# Patient Record
Sex: FEMALE | Race: WHITE | NOT HISPANIC OR LATINO | Employment: PART TIME | URBAN - METROPOLITAN AREA
[De-identification: names, ages, dates, MRNs, and addresses within clinical notes are randomized per-mention and may not be internally consistent; named-entity substitution may affect disease eponyms.]

---

## 2019-05-07 ENCOUNTER — OFFICE VISIT (OUTPATIENT)
Dept: URGENT CARE | Facility: PHYSICIAN GROUP | Age: 24
End: 2019-05-07
Payer: COMMERCIAL

## 2019-05-07 ENCOUNTER — HOSPITAL ENCOUNTER (OUTPATIENT)
Facility: MEDICAL CENTER | Age: 24
End: 2019-05-07
Attending: FAMILY MEDICINE
Payer: COMMERCIAL

## 2019-05-07 VITALS
SYSTOLIC BLOOD PRESSURE: 104 MMHG | HEIGHT: 63 IN | WEIGHT: 160 LBS | TEMPERATURE: 98.6 F | OXYGEN SATURATION: 98 % | HEART RATE: 89 BPM | DIASTOLIC BLOOD PRESSURE: 72 MMHG | BODY MASS INDEX: 28.35 KG/M2

## 2019-05-07 DIAGNOSIS — N30.01 ACUTE CYSTITIS WITH HEMATURIA: ICD-10-CM

## 2019-05-07 LAB
APPEARANCE UR: CLEAR
BILIRUB UR STRIP-MCNC: NORMAL MG/DL
COLOR UR AUTO: YELLOW
GLUCOSE UR STRIP.AUTO-MCNC: NORMAL MG/DL
KETONES UR STRIP.AUTO-MCNC: NORMAL MG/DL
LEUKOCYTE ESTERASE UR QL STRIP.AUTO: NORMAL
NITRITE UR QL STRIP.AUTO: NORMAL
PH UR STRIP.AUTO: 7 [PH] (ref 5–8)
PROT UR QL STRIP: NORMAL MG/DL
RBC UR QL AUTO: NORMAL
SP GR UR STRIP.AUTO: 1.01
UROBILINOGEN UR STRIP-MCNC: 0.2 MG/DL

## 2019-05-07 PROCEDURE — 81002 URINALYSIS NONAUTO W/O SCOPE: CPT | Performed by: FAMILY MEDICINE

## 2019-05-07 PROCEDURE — 87086 URINE CULTURE/COLONY COUNT: CPT

## 2019-05-07 PROCEDURE — 99203 OFFICE O/P NEW LOW 30 MIN: CPT | Performed by: FAMILY MEDICINE

## 2019-05-07 PROCEDURE — 87077 CULTURE AEROBIC IDENTIFY: CPT

## 2019-05-07 PROCEDURE — 87186 SC STD MICRODIL/AGAR DIL: CPT

## 2019-05-07 RX ORDER — NITROFURANTOIN 25; 75 MG/1; MG/1
100 CAPSULE ORAL EVERY 12 HOURS
Qty: 10 CAP | Refills: 0 | Status: SHIPPED | OUTPATIENT
Start: 2019-05-07 | End: 2019-05-12

## 2019-05-07 RX ORDER — PHENAZOPYRIDINE HYDROCHLORIDE 200 MG/1
200 TABLET, FILM COATED ORAL 3 TIMES DAILY
Qty: 6 TAB | Refills: 0 | Status: SHIPPED | OUTPATIENT
Start: 2019-05-07 | End: 2019-05-09

## 2019-05-07 RX ORDER — NORETHINDRONE ACETATE AND ETHINYL ESTRADIOL 1.5-30(21)
KIT ORAL
COMMUNITY

## 2019-05-07 ASSESSMENT — ENCOUNTER SYMPTOMS
EYE REDNESS: 0
FLANK PAIN: 0
EYE DISCHARGE: 0
WEIGHT LOSS: 0

## 2019-05-07 NOTE — PROGRESS NOTES
"Subjective:      Sushma Verde is a 23 y.o. female who presents with Dysuria (blood in urine x2days)            2 days urinary burning, urgency, frequency, hematuria. No fever. No flank pain. No PMH UTI or pyelonephritis. No OTC medication. Trying to push fluids. Sx's severe yesterday. No other aggravating or alleviating factors.      Denies possible pregnancy. LMP 5/2. No concern for STI.     Review of Systems   Constitutional: Negative for malaise/fatigue and weight loss.   Eyes: Negative for discharge and redness.   Genitourinary: Negative for dysuria, flank pain, frequency, hematuria and urgency.        No vaginal discharge     Skin: Negative for itching and rash.     .  Medications, Allergies, and current problem list reviewed today in Epic       Objective:     /72   Pulse 89   Temp 37 °C (98.6 °F) (Temporal)   Ht 1.6 m (5' 3\")   Wt 72.6 kg (160 lb)   LMP 05/02/2019   SpO2 98%   BMI 28.34 kg/m²      Physical Exam   Constitutional: She is oriented to person, place, and time. She appears well-developed and well-nourished. No distress.   HENT:   Head: Normocephalic and atraumatic.   Eyes: Conjunctivae are normal.   Cardiovascular: Normal rate, regular rhythm and normal heart sounds.    Pulmonary/Chest: Effort normal and breath sounds normal.   Genitourinary:   Genitourinary Comments: Suprapubic tenderness. No CVAT   Neurological: She is alert and oriented to person, place, and time.   Skin: Skin is warm and dry. No rash noted.               Assessment/Plan:     UA reviewed    1. Acute cystitis with hematuria  POCT Urinalysis    nitrofurantoin monohyd macro (MACROBID) 100 MG Cap    phenazopyridine (PYRIDIUM) 200 MG Tab    Urine Culture     Differential diagnosis, natural history, supportive care, and indications for immediate follow-up discussed at length.     Will f/u culture    "

## 2019-05-08 LAB
AMBIGUOUS DTTM AMBI4: NORMAL
SIGNIFICANT IND 70042: NORMAL
SITE SITE: NORMAL
SOURCE SOURCE: NORMAL

## 2019-05-10 LAB
BACTERIA UR CULT: ABNORMAL
BACTERIA UR CULT: ABNORMAL
SIGNIFICANT IND 70042: ABNORMAL
SITE SITE: ABNORMAL
SOURCE SOURCE: ABNORMAL

## 2019-07-26 ENCOUNTER — OFFICE VISIT (OUTPATIENT)
Dept: URGENT CARE | Facility: PHYSICIAN GROUP | Age: 24
End: 2019-07-26
Payer: COMMERCIAL

## 2019-07-26 VITALS
TEMPERATURE: 97.9 F | SYSTOLIC BLOOD PRESSURE: 118 MMHG | HEIGHT: 63 IN | HEART RATE: 69 BPM | BODY MASS INDEX: 30.12 KG/M2 | DIASTOLIC BLOOD PRESSURE: 82 MMHG | OXYGEN SATURATION: 95 % | WEIGHT: 170 LBS

## 2019-07-26 DIAGNOSIS — H10.31 ACUTE CONJUNCTIVITIS OF RIGHT EYE, UNSPECIFIED ACUTE CONJUNCTIVITIS TYPE: ICD-10-CM

## 2019-07-26 PROCEDURE — 99214 OFFICE O/P EST MOD 30 MIN: CPT | Performed by: PHYSICIAN ASSISTANT

## 2019-07-26 RX ORDER — POLYMYXIN B SULFATE AND TRIMETHOPRIM 1; 10000 MG/ML; [USP'U]/ML
1 SOLUTION OPHTHALMIC EVERY 4 HOURS
Qty: 10 ML | Refills: 0 | Status: SHIPPED | OUTPATIENT
Start: 2019-07-26 | End: 2019-07-31

## 2019-07-26 ASSESSMENT — ENCOUNTER SYMPTOMS
VOMITING: 0
FOREIGN BODY SENSATION: 0
FEVER: 0
MYALGIAS: 0
SORE THROAT: 0
EYE REDNESS: 1
EYE ITCHING: 1
BLURRED VISION: 0
DOUBLE VISION: 0
SHORTNESS OF BREATH: 0
EYE DISCHARGE: 1
NAUSEA: 0
HEADACHES: 0
COUGH: 0
ABDOMINAL PAIN: 0

## 2019-07-26 NOTE — PROGRESS NOTES
Subjective:      Sushma Verde is a 23 y.o. female who presents with Eye Problem (R eye, itchy, red, discharge in the morning causing it to be closed x1 day )          Eye Problem    The right eye is affected. The current episode started yesterday. The problem occurs constantly. The problem has been gradually improving. There was no injury mechanism. The patient is experiencing no pain. There is no known exposure to pink eye. She does not wear contacts. Associated symptoms include an eye discharge, eye redness, itching and a recent URI. Pertinent negatives include no blurred vision, double vision, fever, foreign body sensation, nausea or vomiting. Treatments tried: OTC allergy medication and OTC cold medications. The treatment provided mild relief.     The patient presents to clinic c/o right eye redness and discharge x 1 day. The patient reports associated congestion. The patient is concerned she may have pink eye. The patient also reports seasonal allergies. The patient notes crusting to her right eye yesterday morning. She also states the right eye is itchy. The patient denies eye pain and vision changes. The patient has taken OTC allergy medication and cold medication for her symptoms. No fever. No headache.       PMH:  has a past medical history of Seizure ([facility]) (8-2010).  MEDS:   Current Outpatient Prescriptions:   •  norethindrone-ethinyl estradiol-iron (MICROGESTIN FE1.5/30) 1.5-30 MG-MCG tablet, Take  by mouth., Disp: , Rfl:   •  zonisamide (ZONEGRAN) 100 MG CAPS, Take 100 mg by mouth every bedtime., Disp: , Rfl:   ALLERGIES: No Known Allergies  SURGHX:   Past Surgical History:   Procedure Laterality Date   • ACL RECONSTRUCTION SCOPE  2/29/2012    Performed by KRISSY BONE at SURGERY [facility] ORS   • MENISCECTOMY  2/29/2012    Performed by KRISSY BONE at SURGERY [facility] ORS   • DENTAL EXTRACTION(S)  7-2011    wisdom teeth     SOCHX:  reports that she has never smoked. She has never  "used smokeless tobacco. She reports that she drinks alcohol. She reports that she does not use drugs.  FH: Family history was reviewed, no pertinent findings to report    Review of Systems   Constitutional: Negative for fever.   HENT: Positive for congestion. Negative for ear pain and sore throat.    Eyes: Positive for discharge, redness and itching. Negative for blurred vision and double vision.   Respiratory: Negative for cough and shortness of breath.    Cardiovascular: Negative for chest pain and leg swelling.   Gastrointestinal: Negative for abdominal pain, nausea and vomiting.   Musculoskeletal: Negative for myalgias.   Skin: Negative for rash.   Neurological: Negative for headaches.          Objective:     /82 (BP Location: Right arm, Patient Position: Sitting, BP Cuff Size: Adult)   Pulse 69   Temp 36.6 °C (97.9 °F) (Temporal)   Ht 1.6 m (5' 3\")   Wt 77.1 kg (170 lb)   SpO2 95%   BMI 30.11 kg/m²      Physical Exam   Constitutional: She is oriented to person, place, and time. She appears well-developed and well-nourished. No distress.   HENT:   Head: Normocephalic and atraumatic.   Right Ear: External ear normal.   Left Ear: External ear normal.   Nose: Nose normal.   Mouth/Throat: Oropharynx is clear and moist.   Eyes: Pupils are equal, round, and reactive to light. EOM are normal. Right eye exhibits discharge. Left eye exhibits no discharge. Right conjunctiva is injected. Left conjunctiva is not injected.   Neck: Normal range of motion. Neck supple.   Cardiovascular: Normal rate.    Pulmonary/Chest: Effort normal.   Musculoskeletal: Normal range of motion.   Moves all 4 extremities.    Neurological: She is alert and oriented to person, place, and time.   Skin: Skin is warm and dry.               Assessment/Plan:     1. Acute conjunctivitis of right eye, unspecified acute conjunctivitis type  The patient's presenting symptoms and physical exam are consistent with acute conjunctivitis. Will " prescribe the patient a topical opthalmic antibiotic for her symptoms. Will also prescribe the patient Naphcon-A for symptomatic relief of her eye itching. Discussed strict return precautions with the patient, and she verbalized understanding.    - polymixin-trimethoprim (POLYTRIM) 56177-1.1 UNIT/ML-% Solution; Place 1 Drop in right eye every 4 hours for 5 days.  Dispense: 10 mL; Refill: 0  - naphazoline-pheniramine (NAPHCON-A) 0.025-0.3 % ophthalmic solution; Place 1 Drop in both eyes 4 times a day as needed.  Dispense: 1 Bottle; Refill: 0'    OTC Tylenol or Motrin for fever/discomfort.  Follow-up with PCP as needed  Return to clinic or go to the ED if symptoms worsen or fail to improve, or if the patient should develop worsening/increasing eye redness, persistent eye discharge, eye pain, vision changes, headache, fever/chills, and/or any concerning symptoms.     Discussed plan with the patient, and she agrees to the above.

## 2019-10-23 ENCOUNTER — OFFICE VISIT (OUTPATIENT)
Dept: URGENT CARE | Facility: CLINIC | Age: 24
End: 2019-10-23
Payer: COMMERCIAL

## 2019-10-23 VITALS
TEMPERATURE: 99.9 F | OXYGEN SATURATION: 95 % | SYSTOLIC BLOOD PRESSURE: 106 MMHG | HEIGHT: 64 IN | RESPIRATION RATE: 14 BRPM | BODY MASS INDEX: 28.85 KG/M2 | HEART RATE: 98 BPM | DIASTOLIC BLOOD PRESSURE: 72 MMHG | WEIGHT: 169 LBS

## 2019-10-23 DIAGNOSIS — J02.9 VIRAL PHARYNGITIS: ICD-10-CM

## 2019-10-23 LAB
HETEROPH AB SER QL LA: NEGATIVE
INT CON NEG: NORMAL
INT CON NEG: NORMAL
INT CON POS: NORMAL
INT CON POS: NORMAL
S PYO AG THROAT QL: NEGATIVE

## 2019-10-23 PROCEDURE — 87880 STREP A ASSAY W/OPTIC: CPT | Performed by: PHYSICIAN ASSISTANT

## 2019-10-23 PROCEDURE — 99214 OFFICE O/P EST MOD 30 MIN: CPT | Performed by: PHYSICIAN ASSISTANT

## 2019-10-23 PROCEDURE — 86308 HETEROPHILE ANTIBODY SCREEN: CPT | Performed by: PHYSICIAN ASSISTANT

## 2019-10-23 ASSESSMENT — ENCOUNTER SYMPTOMS
SORE THROAT: 1
SWOLLEN GLANDS: 1
TROUBLE SWALLOWING: 1
MUSCULOSKELETAL NEGATIVE: 1
DIZZINESS: 0
DIARRHEA: 0
SPUTUM PRODUCTION: 0
CHILLS: 0
VOMITING: 0
ABDOMINAL PAIN: 0
FEVER: 0
COUGH: 0
NAUSEA: 0
SHORTNESS OF BREATH: 0

## 2019-10-24 NOTE — PROGRESS NOTES
"Subjective:      Sushma Verde is a 23 y.o. female who presents with Pharyngitis (couple of days)            Pharyngitis    This is a new problem. The current episode started in the past 7 days (2 days). The problem has been unchanged. There has been no fever. The pain is at a severity of 2/10. The pain is mild. Associated symptoms include swollen glands and trouble swallowing. Pertinent negatives include no abdominal pain, congestion, coughing, diarrhea, ear pain, plugged ear sensation, shortness of breath or vomiting. She has had no exposure to strep or mono. She has tried nothing for the symptoms.     No history of tonsillectomy.     Review of Systems   Constitutional: Negative for chills and fever.   HENT: Positive for sore throat and trouble swallowing. Negative for congestion and ear pain.    Respiratory: Negative for cough, sputum production and shortness of breath.    Cardiovascular: Negative for chest pain.   Gastrointestinal: Negative for abdominal pain, diarrhea, nausea and vomiting.   Genitourinary: Negative.    Musculoskeletal: Negative.    Skin: Negative for rash.   Neurological: Negative for dizziness.        Objective:     /72 (BP Location: Left arm, Patient Position: Sitting, BP Cuff Size: Adult)   Pulse 98   Temp 37.7 °C (99.9 °F)   Resp 14   Ht 1.626 m (5' 4\")   Wt 76.7 kg (169 lb)   SpO2 95%   BMI 29.01 kg/m²      Physical Exam   Constitutional: She is oriented to person, place, and time. She appears well-developed and well-nourished. No distress.   HENT:   Head: Normocephalic and atraumatic.   Right Ear: Hearing, tympanic membrane, external ear and ear canal normal.   Left Ear: Hearing, tympanic membrane, external ear and ear canal normal.   Mouth/Throat: Uvula is midline. No trismus in the jaw. Posterior oropharyngeal erythema present. No oropharyngeal exudate, posterior oropharyngeal edema or tonsillar abscesses. Tonsils are 3+ on the right. Tonsils are 3+ on the left. No " tonsillar exudate.   Eyes: Pupils are equal, round, and reactive to light. Conjunctivae are normal. Right eye exhibits no discharge. Left eye exhibits no discharge.   Neck: Normal range of motion.   Cardiovascular: Normal rate, regular rhythm and normal heart sounds.   No murmur heard.  Pulmonary/Chest: Effort normal. No stridor. No respiratory distress. She has no wheezes.   Musculoskeletal: Normal range of motion.   Lymphadenopathy:     She has cervical adenopathy.   Neurological: She is alert and oriented to person, place, and time.   Skin: Skin is warm and dry. She is not diaphoretic.   Psychiatric: She has a normal mood and affect. Her behavior is normal.   Nursing note and vitals reviewed.         PMH:  has a past medical history of Seizure ([facility]) (8-2010).  MEDS:   Current Outpatient Medications:   •  naphazoline-pheniramine (NAPHCON-A) 0.025-0.3 % ophthalmic solution, Place 1 Drop in both eyes 4 times a day as needed., Disp: 1 Bottle, Rfl: 0  •  norethindrone-ethinyl estradiol-iron (MICROGESTIN FE1.5/30) 1.5-30 MG-MCG tablet, Take  by mouth., Disp: , Rfl:   •  zonisamide (ZONEGRAN) 100 MG CAPS, Take 100 mg by mouth every bedtime., Disp: , Rfl:   ALLERGIES: No Known Allergies  SURGHX:   Past Surgical History:   Procedure Laterality Date   • ACL RECONSTRUCTION SCOPE  2/29/2012    Performed by KRISSY BONE at SURGERY [facility]   • MENISCECTOMY  2/29/2012    Performed by KRISSY BONE at SURGERY [facility] ORS   • DENTAL EXTRACTION(S)  7-2011    wisdom teeth     SOCHX:  reports that she has never smoked. She has never used smokeless tobacco. She reports that she drinks alcohol. She reports that she does not use drugs.  FH: family history is not on file.       Assessment/Plan:     1. Viral pharyngitis  - POCT Rapid Strep A: NEGATIVE   - POCT Mononucleosis (mono): NEGATIVE     Advised patient symptoms are most likely viral in etiology, recommend supportive care. Increased fluids and rest. Encouraged  warm salt water gargles and OTC ibuprofen as needed for symptomatic relief. Call or return to office if symptoms persist or worsen. The patient demonstrated a good understanding and agreed with the treatment plan.

## 2020-12-01 ENCOUNTER — OFFICE VISIT (OUTPATIENT)
Dept: URGENT CARE | Facility: CLINIC | Age: 25
End: 2020-12-01
Payer: OTHER GOVERNMENT

## 2020-12-01 ENCOUNTER — HOSPITAL ENCOUNTER (OUTPATIENT)
Facility: MEDICAL CENTER | Age: 25
End: 2020-12-01
Attending: PHYSICIAN ASSISTANT
Payer: OTHER GOVERNMENT

## 2020-12-01 VITALS
WEIGHT: 165 LBS | RESPIRATION RATE: 14 BRPM | OXYGEN SATURATION: 98 % | HEART RATE: 80 BPM | TEMPERATURE: 98.4 F | SYSTOLIC BLOOD PRESSURE: 122 MMHG | HEIGHT: 64 IN | BODY MASS INDEX: 28.17 KG/M2 | DIASTOLIC BLOOD PRESSURE: 76 MMHG

## 2020-12-01 DIAGNOSIS — R43.0 ANOSMIA: ICD-10-CM

## 2020-12-01 DIAGNOSIS — Z20.822 SUSPECTED COVID-19 VIRUS INFECTION: ICD-10-CM

## 2020-12-01 DIAGNOSIS — R43.2 AGEUSIA: ICD-10-CM

## 2020-12-01 PROCEDURE — 99214 OFFICE O/P EST MOD 30 MIN: CPT | Mod: CS | Performed by: PHYSICIAN ASSISTANT

## 2020-12-01 PROCEDURE — U0003 INFECTIOUS AGENT DETECTION BY NUCLEIC ACID (DNA OR RNA); SEVERE ACUTE RESPIRATORY SYNDROME CORONAVIRUS 2 (SARS-COV-2) (CORONAVIRUS DISEASE [COVID-19]), AMPLIFIED PROBE TECHNIQUE, MAKING USE OF HIGH THROUGHPUT TECHNOLOGIES AS DESCRIBED BY CMS-2020-01-R: HCPCS

## 2020-12-01 ASSESSMENT — ENCOUNTER SYMPTOMS
ABDOMINAL PAIN: 0
CONSTIPATION: 0
DIARRHEA: 0
VOMITING: 0
NAUSEA: 0
CHILLS: 0
COUGH: 0
FEVER: 0
SHORTNESS OF BREATH: 0

## 2020-12-01 NOTE — PROGRESS NOTES
"Subjective:   Sushma Verde is a 25 y.o. female who presents for Coronavirus Screening (patient cant taste or smell x 1 day ,  )        HPI   The patient presents to urgent care today for loss of taste and smell.  The symptoms began yesterday.  She denies fever, cough, congestion.  No sore throat, ear pain.  No myalgias, shortness of breath.  She is unsure of any Covid exposure.  She does work with the public at a restaurant.  She has not tried to treat her symptoms.  This is the first episode of its kind.  No other aggravating or alleviating factors.     Review of Systems   Constitutional: Negative for chills, fever and malaise/fatigue.   HENT:        Loss of taste and smell   Respiratory: Negative for cough and shortness of breath.    Gastrointestinal: Negative for abdominal pain, constipation, diarrhea, nausea and vomiting.   All other systems reviewed and are negative.      PMH:  has a past medical history of Seizure ([facility]) (8-2010).  MEDS:   Current Outpatient Medications:   •  norethindrone-ethinyl estradiol-iron (MICROGESTIN FE1.5/30) 1.5-30 MG-MCG tablet, Take  by mouth., Disp: , Rfl:   •  zonisamide (ZONEGRAN) 100 MG CAPS, Take 100 mg by mouth every bedtime., Disp: , Rfl:   ALLERGIES: No Known Allergies  SURGHX:   Past Surgical History:   Procedure Laterality Date   • ACL RECONSTRUCTION SCOPE  2/29/2012    Performed by KRISSY BONE at SURGERY [facility] ORS   • MENISCECTOMY  2/29/2012    Performed by KRISSY BONE at SURGERY [facility] ORS   • DENTAL EXTRACTION(S)  7-2011    wisdom teeth     SOCHX:  reports that she has never smoked. She has never used smokeless tobacco. She reports current alcohol use. She reports that she does not use drugs.  History reviewed. No pertinent family history.     Objective:   /76   Pulse 80   Temp 36.9 °C (98.4 °F) (Temporal)   Resp 14   Ht 1.626 m (5' 4\")   Wt 74.8 kg (165 lb)   LMP 11/12/2020   SpO2 98%   BMI 28.32 kg/m²     Physical " Exam  Vitals signs reviewed.   Constitutional:       General: She is not in acute distress.     Appearance: Normal appearance. She is well-developed. She is not ill-appearing.   HENT:      Head: Normocephalic and atraumatic.      Right Ear: Tympanic membrane and external ear normal.      Left Ear: Tympanic membrane and external ear normal.      Nose: Nose normal.      Mouth/Throat:      Mouth: Mucous membranes are moist.      Pharynx: No posterior oropharyngeal erythema.   Eyes:      Conjunctiva/sclera: Conjunctivae normal.      Pupils: Pupils are equal, round, and reactive to light.   Neck:      Musculoskeletal: Normal range of motion and neck supple.      Trachea: No tracheal deviation.   Cardiovascular:      Rate and Rhythm: Normal rate and regular rhythm.   Pulmonary:      Effort: Pulmonary effort is normal. No respiratory distress.      Breath sounds: Normal breath sounds. No wheezing, rhonchi or rales.   Skin:     General: Skin is warm and dry.      Capillary Refill: Capillary refill takes less than 2 seconds.   Neurological:      General: No focal deficit present.      Mental Status: She is alert and oriented to person, place, and time.   Psychiatric:         Mood and Affect: Mood normal.         Behavior: Behavior normal.           Assessment/Plan:     1. Suspected COVID-19 virus infection  COVID/SARS COV-2 PCR   2. Anosmia     3. Ageusia       Supportive care reviewed.  Monitor symptoms closely.  She will be notified of final Covid test results via Down[employer].  Downtyme code generated and sent via text message during visit. The patient will quarantine until covid test results are finalized. The pt will continue to quarantine until resolution of symptoms for 24 hours without the use of antipyretics. If test results are positive the pt will also wait at least 10 days have passed since onset of symptoms per CDC guidelines.  Covid handout provided.    If symptoms worsen or persist patient can return to clinic for  reevaluation.  Red flags and emergency room precautions discussed. Patient confirmed understanding of information.    Please note that this dictation was created using voice recognition software. I have made every reasonable attempt to correct obvious errors, but I expect that there are errors of grammar and possibly content that I did not discover before finalizing the note.

## 2020-12-02 LAB
COVID ORDER STATUS COVID19: NORMAL
SARS-COV-2 RNA RESP QL NAA+PROBE: DETECTED
SPECIMEN SOURCE: ABNORMAL

## 2021-03-29 ENCOUNTER — HOSPITAL ENCOUNTER (OUTPATIENT)
Facility: MEDICAL CENTER | Age: 26
End: 2021-03-29
Attending: PHYSICIAN ASSISTANT
Payer: OTHER GOVERNMENT

## 2021-03-29 ENCOUNTER — OFFICE VISIT (OUTPATIENT)
Dept: URGENT CARE | Facility: CLINIC | Age: 26
End: 2021-03-29

## 2021-03-29 VITALS
RESPIRATION RATE: 20 BRPM | BODY MASS INDEX: 27.84 KG/M2 | DIASTOLIC BLOOD PRESSURE: 72 MMHG | HEART RATE: 110 BPM | HEIGHT: 64 IN | OXYGEN SATURATION: 97 % | TEMPERATURE: 98.3 F | SYSTOLIC BLOOD PRESSURE: 114 MMHG | WEIGHT: 163.1 LBS

## 2021-03-29 DIAGNOSIS — R30.0 DYSURIA: ICD-10-CM

## 2021-03-29 LAB
APPEARANCE UR: NORMAL
BILIRUB UR STRIP-MCNC: NEGATIVE MG/DL
COLOR UR AUTO: YELLOW
GLUCOSE UR STRIP.AUTO-MCNC: NEGATIVE MG/DL
INT CON NEG: NORMAL
INT CON POS: NORMAL
KETONES UR STRIP.AUTO-MCNC: NEGATIVE MG/DL
LEUKOCYTE ESTERASE UR QL STRIP.AUTO: NORMAL
NITRITE UR QL STRIP.AUTO: POSITIVE
PH UR STRIP.AUTO: 5.5 [PH] (ref 5–8)
POC URINE PREGNANCY TEST: NEGATIVE
PROT UR QL STRIP: 100 MG/DL
RBC UR QL AUTO: NORMAL
SP GR UR STRIP.AUTO: >=1.03
UROBILINOGEN UR STRIP-MCNC: 0.2 MG/DL

## 2021-03-29 PROCEDURE — 81002 URINALYSIS NONAUTO W/O SCOPE: CPT | Performed by: PHYSICIAN ASSISTANT

## 2021-03-29 PROCEDURE — 87186 SC STD MICRODIL/AGAR DIL: CPT

## 2021-03-29 PROCEDURE — 87086 URINE CULTURE/COLONY COUNT: CPT

## 2021-03-29 PROCEDURE — 87077 CULTURE AEROBIC IDENTIFY: CPT | Mod: 91

## 2021-03-29 PROCEDURE — 81025 URINE PREGNANCY TEST: CPT | Performed by: PHYSICIAN ASSISTANT

## 2021-03-29 PROCEDURE — 99213 OFFICE O/P EST LOW 20 MIN: CPT | Performed by: PHYSICIAN ASSISTANT

## 2021-03-29 RX ORDER — NITROFURANTOIN 25; 75 MG/1; MG/1
100 CAPSULE ORAL 2 TIMES DAILY
Qty: 10 CAPSULE | Refills: 0 | Status: SHIPPED | OUTPATIENT
Start: 2021-03-29 | End: 2021-04-03

## 2021-03-29 ASSESSMENT — ENCOUNTER SYMPTOMS
SHORTNESS OF BREATH: 0
PALPITATIONS: 0
CHILLS: 0
COUGH: 0
NAUSEA: 0
ABDOMINAL PAIN: 0
FLANK PAIN: 0
FEVER: 0
BACK PAIN: 0
VOMITING: 0

## 2021-03-29 NOTE — PROGRESS NOTES
"Subjective:   Sushma Verde is a 25 y.o. female who presents for Urinary Frequency (painful urination x 4 days )      Urinary Frequency  This is a new problem. The current episode started in the past 7 days. Pertinent negatives include no abdominal pain, chest pain, chills, coughing, fever, nausea or vomiting. Nothing aggravates the symptoms. She has tried nothing for the symptoms.       Review of Systems   Constitutional: Negative for chills and fever.   Respiratory: Negative for cough and shortness of breath.    Cardiovascular: Negative for chest pain and palpitations.   Gastrointestinal: Negative for abdominal pain, nausea and vomiting.   Genitourinary: Positive for dysuria, frequency and urgency. Negative for flank pain and hematuria.   Musculoskeletal: Negative for back pain.   All other systems reviewed and are negative.      Medications:    • norethindrone-ethinyl estradiol-iron  • zonisamide Caps    Allergies: Patient has no known allergies.    Problem List: Sushma Verde does not have a problem list on file.    Surgical History:  Past Surgical History:   Procedure Laterality Date   • ACL RECONSTRUCTION SCOPE  2/29/2012    Performed by KRISSY BONE at SURGERY [facility] ORS   • MENISCECTOMY  2/29/2012    Performed by KRISSY BONE at SURGERY [facility] ORS   • DENTAL EXTRACTION(S)  7-2011    wisdom teeth       Past Social Hx: Sushma Verde  reports that she has quit smoking. She has never used smokeless tobacco. She reports current alcohol use. She reports that she does not use drugs.     Past Family Hx:  Sushma Verde family history is not on file.     Problem list, medications, and allergies reviewed by myself today in Epic.     Objective:     Blood Pressure 114/72 (BP Location: Right arm, Patient Position: Sitting)   Pulse (Abnormal) 110   Temperature 36.8 °C (98.3 °F) (Temporal)   Respiration 20   Height 1.626 m (5' 4\")   Weight 74 kg (163 lb 1.6 oz)   " Oxygen Saturation 97%   Body Mass Index 28.00 kg/m²     Physical Exam  Vitals reviewed.   Constitutional:       Appearance: She is well-developed.   HENT:      Head: Normocephalic and atraumatic.      Right Ear: External ear normal.      Left Ear: External ear normal.      Nose: Nose normal.   Cardiovascular:      Rate and Rhythm: Normal rate and regular rhythm.      Heart sounds: Normal heart sounds.   Pulmonary:      Effort: Pulmonary effort is normal.      Breath sounds: Normal breath sounds.   Abdominal:      General: Bowel sounds are normal. There is no distension.      Palpations: Abdomen is soft. There is no mass.      Tenderness: There is abdominal tenderness. There is no right CVA tenderness, left CVA tenderness, guarding or rebound.      Hernia: No hernia is present.   Musculoskeletal:      Cervical back: Normal range of motion and neck supple.   Skin:     General: Skin is warm and dry.   Neurological:      Mental Status: She is alert and oriented to person, place, and time.   Psychiatric:         Behavior: Behavior normal.         Thought Content: Thought content normal.         Judgment: Judgment normal.         Assessment/Plan:     Medical Decision Making/Comments     Pt is a 25 yr old female who presents for evaluation of dysuria.  Pt states she has had dysuria, frequency, and urgency for 4 days.  Denies fevers, flank pain/chills, nausea/vomiting, or vaginal discharge.  Pt is not pregnant, diabetic or immunocompromised.  No use of catheters.  Vital signs normal.  Pt does not appear ill or altered mental status.  There is mild PTP of the abdomen and no CVA tenderness.  UA is suggestive of bacteriuria.     Diagnosis and associated orders     1. Dysuria  POCT Urinalysis    POCT PREGNANCY    nitrofurantoin (MACROBID) 100 MG Cap    URINE CULTURE(NEW)              Differential diagnosis, natural history, supportive care, and indications for immediate follow-up discussed.    Advised the patient to follow-up  with the primary care physician for recheck, reevaluation, and consideration of further management.    Please note that this dictation was created using voice recognition software. I have made a reasonable attempt to correct obvious errors, but I expect that there are errors of grammar and possibly content that I did not discover before finalizing the note.

## 2021-03-30 DIAGNOSIS — R30.0 DYSURIA: ICD-10-CM

## 2021-04-11 ENCOUNTER — OFFICE VISIT (OUTPATIENT)
Dept: URGENT CARE | Facility: CLINIC | Age: 26
End: 2021-04-11

## 2021-04-11 ENCOUNTER — HOSPITAL ENCOUNTER (OUTPATIENT)
Facility: MEDICAL CENTER | Age: 26
End: 2021-04-11
Attending: NURSE PRACTITIONER
Payer: OTHER GOVERNMENT

## 2021-04-11 VITALS
BODY MASS INDEX: 28.54 KG/M2 | RESPIRATION RATE: 16 BRPM | DIASTOLIC BLOOD PRESSURE: 70 MMHG | WEIGHT: 167.2 LBS | HEART RATE: 90 BPM | OXYGEN SATURATION: 98 % | HEIGHT: 64 IN | SYSTOLIC BLOOD PRESSURE: 118 MMHG | TEMPERATURE: 96.7 F

## 2021-04-11 DIAGNOSIS — N12 PYELONEPHRITIS: ICD-10-CM

## 2021-04-11 LAB
APPEARANCE UR: NORMAL
BILIRUB UR STRIP-MCNC: NEGATIVE MG/DL
COLOR UR AUTO: NORMAL
GLUCOSE UR STRIP.AUTO-MCNC: NEGATIVE MG/DL
KETONES UR STRIP.AUTO-MCNC: NEGATIVE MG/DL
LEUKOCYTE ESTERASE UR QL STRIP.AUTO: NORMAL
NITRITE UR QL STRIP.AUTO: POSITIVE
PH UR STRIP.AUTO: 5.5 [PH] (ref 5–8)
PROT UR QL STRIP: 30 MG/DL
RBC UR QL AUTO: NORMAL
SP GR UR STRIP.AUTO: 1.02
UROBILINOGEN UR STRIP-MCNC: 0.2 MG/DL

## 2021-04-11 PROCEDURE — 87186 SC STD MICRODIL/AGAR DIL: CPT

## 2021-04-11 PROCEDURE — 87086 URINE CULTURE/COLONY COUNT: CPT

## 2021-04-11 PROCEDURE — 81002 URINALYSIS NONAUTO W/O SCOPE: CPT | Performed by: NURSE PRACTITIONER

## 2021-04-11 PROCEDURE — 99214 OFFICE O/P EST MOD 30 MIN: CPT | Performed by: NURSE PRACTITIONER

## 2021-04-11 PROCEDURE — 87077 CULTURE AEROBIC IDENTIFY: CPT

## 2021-04-11 RX ORDER — SULFAMETHOXAZOLE AND TRIMETHOPRIM 800; 160 MG/1; MG/1
1 TABLET ORAL EVERY 12 HOURS
Qty: 28 TABLET | Refills: 0 | Status: SHIPPED | OUTPATIENT
Start: 2021-04-11 | End: 2021-04-25

## 2021-04-11 RX ORDER — CEFTRIAXONE 500 MG/1
1000 INJECTION, POWDER, FOR SOLUTION INTRAMUSCULAR; INTRAVENOUS ONCE
Status: DISCONTINUED | OUTPATIENT
Start: 2021-04-11 | End: 2021-04-11

## 2021-04-11 ASSESSMENT — ENCOUNTER SYMPTOMS
CHILLS: 1
NAUSEA: 1
SORE THROAT: 0
FLANK PAIN: 1
MYALGIAS: 0
NUMBNESS: 0
SHORTNESS OF BREATH: 0
DIZZINESS: 0
FEVER: 1
VOMITING: 0
COUGH: 0
EYE PAIN: 0
ABDOMINAL PAIN: 1
FATIGUE: 1

## 2021-04-11 NOTE — PROGRESS NOTES
Subjective:   Sushma Verde is a 25 y.o. female who presents for UTI (5x days, lower back and pelvic pain, urine foul odor, chills)      UTI  This is a new problem. Episode onset: 5 days; was seen and treated for UTI 3/29 had improvement of symptoms up to 5 days ago. The problem occurs constantly. The problem has been gradually worsening. Associated symptoms include abdominal pain, chills, fatigue, a fever, nausea and urinary symptoms. Pertinent negatives include no chest pain, coughing, myalgias, numbness, rash, sore throat or vomiting. Nothing aggravates the symptoms. She has tried acetaminophen for the symptoms. The treatment provided no relief.       Review of Systems   Constitutional: Positive for chills, fatigue, fever and malaise/fatigue.   HENT: Negative for sore throat.    Eyes: Negative for pain.   Respiratory: Negative for cough and shortness of breath.    Cardiovascular: Negative for chest pain.   Gastrointestinal: Positive for abdominal pain and nausea. Negative for vomiting.   Genitourinary: Positive for dysuria, flank pain, frequency and urgency. Negative for hematuria.   Musculoskeletal: Negative for myalgias.   Skin: Negative for rash.   Neurological: Negative for dizziness and numbness.       Medications:    • ceftriaxone (ROCEPHIN) 1g - lidocaine 1% 3.6 mL for IM use  • lidocaine  • norethindrone-ethinyl estradiol-iron  • sulfamethoxazole-trimethoprim  • zonisamide Caps    Allergies: Patient has no known allergies.    Problem List: Sushma Verde does not have a problem list on file.    Surgical History:  Past Surgical History:   Procedure Laterality Date   • ACL RECONSTRUCTION SCOPE  2/29/2012    Performed by KRISSY BONE at SURGERY [facility] ORS   • MENISCECTOMY  2/29/2012    Performed by KRISSY BONE at SURGERY [facility] ORS   • DENTAL EXTRACTION(S)  7-2011    wisdom teeth       Past Social Hx: Sushma Verde  reports that she has quit smoking. She has never  "used smokeless tobacco. She reports current alcohol use. She reports that she does not use drugs.     Past Family Hx:  Sushma Verde family history is not on file.     Problem list, medications, and allergies reviewed by myself today in Epic.     Objective:     /70 (BP Location: Left arm, Patient Position: Sitting, BP Cuff Size: Adult)   Pulse 90   Temp 35.9 °C (96.7 °F) (Temporal)   Resp 16   Ht 1.626 m (5' 4\")   Wt 75.8 kg (167 lb 3.2 oz)   SpO2 98%   BMI 28.70 kg/m²     Physical Exam  Vitals and nursing note reviewed.   Constitutional:       General: She is not in acute distress.     Appearance: She is well-developed.   HENT:      Head: Normocephalic and atraumatic.      Right Ear: External ear normal.      Left Ear: External ear normal.      Nose: Nose normal.      Mouth/Throat:      Mouth: Mucous membranes are moist.   Eyes:      Conjunctiva/sclera: Conjunctivae normal.   Cardiovascular:      Rate and Rhythm: Normal rate.   Pulmonary:      Effort: Pulmonary effort is normal. No respiratory distress.      Breath sounds: Normal breath sounds.   Abdominal:      General: There is no distension.      Tenderness: There is abdominal tenderness in the suprapubic area. There is right CVA tenderness. There is no left CVA tenderness.   Musculoskeletal:         General: Normal range of motion.   Skin:     General: Skin is warm and dry.   Neurological:      General: No focal deficit present.      Mental Status: She is alert and oriented to person, place, and time. Mental status is at baseline.      Gait: Gait (gait at baseline) normal.   Psychiatric:         Judgment: Judgment normal.         Assessment/Plan:     Diagnosis and associated orders:     1. Pyelonephritis  POCT Urinalysis    Urine Culture    sulfamethoxazole-trimethoprim (BACTRIM DS) 800-160 MG tablet    lidocaine (XYLOCAINE) 1 % injection 2.1 mL    cefTRIAXone (ROCEPHIN) 1 g, lidocaine (XYLOCAINE) 1 % 3.6 mL for IM use    DISCONTINUED: " cefTRIAXone (ROCEPHIN) injection 1,000 mg      Comments/MDM:     Results for orders placed or performed in visit on 04/11/21   POCT Urinalysis   Result Value Ref Range    POC Color orange Negative    POC Appearance cloudy Negative    POC Leukocyte Esterase small Negative    POC Nitrites positive Negative    POC Urobiligen 0.2 Negative (0.2) mg/dL    POC Protein 30 Negative mg/dL    POC Urine PH 5.5 5.0 - 8.0    POC Blood moderate Negative    POC Specific Gravity 1.020 <1.005 - >1.030    POC Ketones negative Negative mg/dL    POC Bilirubin negative Negative mg/dL    POC Glucose negative Negative mg/dL •         Patient is a 25-year-old female return to clinic for the stated above, previous encounter from 3/29 reviewed for this visit urine culture positive for E. coli which was sensitive to the Macrobid she took initially.  Patient on exam has right-sided CVA tenderness, she does not have a fever, vital signs stable.  Patient will be treated with a gram of Rocephin and started on oral Bactrim as this was sensitive to the bacteria present initially. Use over-the-counter pain reliever, such as acetaminophen (Tylenol), ibuprofen (Advil, Motrin) or naproxen (Aleve) as needed; follow package directions for dosing.  Differential diagnosis, natural history, supportive care, and indications for immediate follow-up discussed           Please note that this dictation was created using voice recognition software. I have made a reasonable attempt to correct obvious errors, but I expect that there are errors of grammar and possibly content that I did not discover before finalizing the note.    This note was electronically signed by Willi MACKENZIE.

## 2024-05-12 ENCOUNTER — HOSPITAL ENCOUNTER (INPATIENT)
Facility: MEDICAL CENTER | Age: 29
LOS: 4 days | End: 2024-05-16
Attending: OBSTETRICS & GYNECOLOGY | Admitting: OBSTETRICS & GYNECOLOGY
Payer: MEDICAID

## 2024-05-12 LAB
BASOPHILS # BLD AUTO: 0.3 % (ref 0–1.8)
BASOPHILS # BLD: 0.03 K/UL (ref 0–0.12)
CRYSTALS AMN MICRO: NORMAL
EOSINOPHIL # BLD AUTO: 0.03 K/UL (ref 0–0.51)
EOSINOPHIL NFR BLD: 0.3 % (ref 0–6.9)
ERYTHROCYTE [DISTWIDTH] IN BLOOD BY AUTOMATED COUNT: 45.5 FL (ref 35.9–50)
HCT VFR BLD AUTO: 35.7 % (ref 37–47)
HGB BLD-MCNC: 12.5 G/DL (ref 12–16)
HOLDING TUBE BB 8507: NORMAL
IMM GRANULOCYTES # BLD AUTO: 0.13 K/UL (ref 0–0.11)
IMM GRANULOCYTES NFR BLD AUTO: 1.2 % (ref 0–0.9)
LYMPHOCYTES # BLD AUTO: 1.97 K/UL (ref 1–4.8)
LYMPHOCYTES NFR BLD: 18.1 % (ref 22–41)
MCH RBC QN AUTO: 32 PG (ref 27–33)
MCHC RBC AUTO-ENTMCNC: 35 G/DL (ref 32.2–35.5)
MCV RBC AUTO: 91.3 FL (ref 81.4–97.8)
MONOCYTES # BLD AUTO: 0.67 K/UL (ref 0–0.85)
MONOCYTES NFR BLD AUTO: 6.2 % (ref 0–13.4)
NEUTROPHILS # BLD AUTO: 8.04 K/UL (ref 1.82–7.42)
NEUTROPHILS NFR BLD: 73.9 % (ref 44–72)
NRBC # BLD AUTO: 0 K/UL
NRBC BLD-RTO: 0 /100 WBC (ref 0–0.2)
PLATELET # BLD AUTO: 235 K/UL (ref 164–446)
PMV BLD AUTO: 10.5 FL (ref 9–12.9)
RBC # BLD AUTO: 3.91 M/UL (ref 4.2–5.4)
T PALLIDUM AB SER QL IA: NORMAL
WBC # BLD AUTO: 10.9 K/UL (ref 4.8–10.8)

## 2024-05-12 RX ORDER — IBUPROFEN 800 MG/1
800 TABLET ORAL
Status: DISCONTINUED | OUTPATIENT
Start: 2024-05-12 | End: 2024-05-14 | Stop reason: HOSPADM

## 2024-05-12 RX ORDER — OXYTOCIN 10 [USP'U]/ML
10 INJECTION, SOLUTION INTRAMUSCULAR; INTRAVENOUS
Status: DISCONTINUED | OUTPATIENT
Start: 2024-05-12 | End: 2024-05-14

## 2024-05-12 RX ORDER — SODIUM CHLORIDE, SODIUM LACTATE, POTASSIUM CHLORIDE, CALCIUM CHLORIDE 600; 310; 30; 20 MG/100ML; MG/100ML; MG/100ML; MG/100ML
INJECTION, SOLUTION INTRAVENOUS CONTINUOUS
Status: DISCONTINUED | OUTPATIENT
Start: 2024-05-12 | End: 2024-05-16 | Stop reason: HOSPADM

## 2024-05-12 RX ORDER — ONDANSETRON 2 MG/ML
4 INJECTION INTRAMUSCULAR; INTRAVENOUS EVERY 6 HOURS PRN
Status: DISCONTINUED | OUTPATIENT
Start: 2024-05-12 | End: 2024-05-14 | Stop reason: HOSPADM

## 2024-05-12 RX ORDER — ACETAMINOPHEN 500 MG
1000 TABLET ORAL
Status: COMPLETED | OUTPATIENT
Start: 2024-05-12 | End: 2024-05-13

## 2024-05-12 RX ORDER — TERBUTALINE SULFATE 1 MG/ML
0.25 INJECTION, SOLUTION SUBCUTANEOUS
Status: DISCONTINUED | OUTPATIENT
Start: 2024-05-12 | End: 2024-05-14

## 2024-05-12 RX ORDER — ONDANSETRON 4 MG/1
4 TABLET, ORALLY DISINTEGRATING ORAL EVERY 6 HOURS PRN
Status: DISCONTINUED | OUTPATIENT
Start: 2024-05-12 | End: 2024-05-14 | Stop reason: HOSPADM

## 2024-05-12 RX ORDER — LIDOCAINE HYDROCHLORIDE 10 MG/ML
20 INJECTION, SOLUTION INFILTRATION; PERINEURAL
Status: COMPLETED | OUTPATIENT
Start: 2024-05-12 | End: 2024-05-14

## 2024-05-12 RX ADMIN — MISOPROSTOL 25 MCG: 100 TABLET ORAL at 12:56

## 2024-05-12 RX ADMIN — MISOPROSTOL 25 MCG: 100 TABLET ORAL at 17:44

## 2024-05-12 RX ADMIN — MISOPROSTOL 25 MCG: 100 TABLET ORAL at 21:49

## 2024-05-12 ASSESSMENT — PATIENT HEALTH QUESTIONNAIRE - PHQ9
2. FEELING DOWN, DEPRESSED, IRRITABLE, OR HOPELESS: NOT AT ALL
SUM OF ALL RESPONSES TO PHQ9 QUESTIONS 1 AND 2: 0
1. LITTLE INTEREST OR PLEASURE IN DOING THINGS: NOT AT ALL

## 2024-05-12 ASSESSMENT — LIFESTYLE VARIABLES
TOTAL SCORE: 0
TOTAL SCORE: 0
DOES PATIENT WANT TO STOP DRINKING: NO
HAVE PEOPLE ANNOYED YOU BY CRITICIZING YOUR DRINKING: NO
AVERAGE NUMBER OF DAYS PER WEEK YOU HAVE A DRINK CONTAINING ALCOHOL: 0
ON A TYPICAL DAY WHEN YOU DRINK ALCOHOL HOW MANY DRINKS DO YOU HAVE: 0
EVER_SMOKED: NEVER
EVER FELT BAD OR GUILTY ABOUT YOUR DRINKING: NO
ALCOHOL_USE: NO
CONSUMPTION TOTAL: NEGATIVE
HOW MANY TIMES IN THE PAST YEAR HAVE YOU HAD 5 OR MORE DRINKS IN A DAY: 0
HAVE YOU EVER FELT YOU SHOULD CUT DOWN ON YOUR DRINKING: NO
TOTAL SCORE: 0
EVER HAD A DRINK FIRST THING IN THE MORNING TO STEADY YOUR NERVES TO GET RID OF A HANGOVER: NO

## 2024-05-12 NOTE — PROGRESS NOTES
EDC -  EGA - 38.2    1000 - Pt arrived to labor and delivery for suspected ROM. Pt placed in room s219.    1012 - External monitors in place X2. Category I FHT at this time. VSS. Pt reports good FM. No complaints of contractions or vaginal bleeding. States SROM at 0300. FERN collected and sent. SVE closed/thick/high. FOB at bedside. POC discussed with pt and family members, all questions answered.      1135 Dr. Jacobo phoned and given report on patient. Admission orders received.     1900 Report given to GHASSAN Massey. POC discussed

## 2024-05-12 NOTE — PROGRESS NOTES
EDC -  EGA - 38.2    1000 - Pt arrived to labor and delivery for suspected ROM. Pt placed in room s219.    1012 - External monitors in place X2. Category I FHT at this time. VSS. Pt reports good FM. No complaints of contractions or vaginal bleeding. States SROM at 0300. FERN collected and sent. SVE closed/thick/high. FOB at bedside. POC discussed with pt and family members, all questions answered.      1135 Dr. Jacobo phoned and given report on patient. Admission orders received.

## 2024-05-12 NOTE — CARE PLAN
Problem: Risk for Infection and Impaired Wound Healing  Goal: Patient will remain free from infection  Outcome: Progressing  Note: VSS. No signs or symptoms of infection at this time. GBS negative. SROM 0300/clear. Limit SVE at this time due to early labor and unmedicated status.      Problem: Pain  Goal: Patient's pain will be alleviated or reduced to the patient’s comfort goal  Outcome: Progressing  Note: No complaints of pain at this time. Will desire epidural placement when active labor begins. Education given on pain management options.    The patient is Stable - Low risk of patient condition declining or worsening

## 2024-05-12 NOTE — H&P
"Labor and Delivery History and Physical    CC: Leaking fluids    HPI: 29yo  at 38w2d presents to L&D with complaint of leaking clear fluids since 3am today. She reports large gushes of fluid. No VB or CTX. Baby moving well. She sees  Working for prenatal care.    All other systems were reviewed and were negative except as stated above.    PMH: hx of seizures remote in past, anemia    PSH: ACL repair, D&C, wisdom teeth removal, blood clot removal in uterus after     OB HX: SAB x 1, EAB x 1    Gyn Hx: abnormal pap in past but last pap WNL, HPV pos    SH: no T/E/D in pregnancy, hx of alcohol use prior to pregnancy    FH: non-contributory to present condition    Meds: PNV    Allergies: NKDA    /69   Pulse 74   Temp 36.4 °C (97.6 °F) (Temporal)   Resp 16   Ht 1.626 m (5' 4\")   Wt 90.7 kg (200 lb)   SpO2 98%   BMI 34.33 kg/m²     Physical Exam  Gen: NAD  Abd: soft, gravid, non tender, no rebound or guarding  EFW: 7 pounds by [place]  Ext: no edema, nontender    FHT: 130s, pos accels, neg decels, moderate variability, reactive, cat 1   [place]: occas ctx  SVE: closed and high per RN exam, grossly ruptured on exam   Bedside MD ultrasound: vertex    Laboratory Evaluation  ABO: B+  GBS: neg  GTT: 120  Rubella: Immune  HIV: Neg  RPR: NR  HepBsAg: neg  Hep C: neg      Assessment:   29yo  at 38w2d  Rupture of membranes prior to labor  Unfavorable cervix  Vertex  Cat 1 FHR    Plan:  Admit to L&D  Fetal monitoring and toco  IV fluids  Misoprostol cervical ripening, pitocin once favorable  Fentanyl/epidural per pt request  Anticipate vaginal delivery        Kati Jacobo M.D.    "

## 2024-05-13 ENCOUNTER — ANESTHESIA (OUTPATIENT)
Dept: ANESTHESIOLOGY | Facility: MEDICAL CENTER | Age: 29
End: 2024-05-13
Payer: MEDICAID

## 2024-05-13 ENCOUNTER — ANESTHESIA EVENT (OUTPATIENT)
Dept: ANESTHESIOLOGY | Facility: MEDICAL CENTER | Age: 29
End: 2024-05-13
Payer: MEDICAID

## 2024-05-13 PROCEDURE — 10H07YZ INSERTION OF OTHER DEVICE INTO PRODUCTS OF CONCEPTION, VIA NATURAL OR ARTIFICIAL OPENING: ICD-10-PCS | Performed by: OBSTETRICS & GYNECOLOGY

## 2024-05-13 RX ORDER — ROPIVACAINE HYDROCHLORIDE 2 MG/ML
INJECTION, SOLUTION EPIDURAL; INFILTRATION; PERINEURAL CONTINUOUS
Status: DISCONTINUED | OUTPATIENT
Start: 2024-05-13 | End: 2024-05-14

## 2024-05-13 RX ORDER — EPHEDRINE SULFATE 50 MG/ML
5 INJECTION, SOLUTION INTRAVENOUS
Status: DISCONTINUED | OUTPATIENT
Start: 2024-05-13 | End: 2024-05-14

## 2024-05-13 RX ORDER — SODIUM CHLORIDE, SODIUM LACTATE, POTASSIUM CHLORIDE, AND CALCIUM CHLORIDE .6; .31; .03; .02 G/100ML; G/100ML; G/100ML; G/100ML
250 INJECTION, SOLUTION INTRAVENOUS PRN
Status: DISCONTINUED | OUTPATIENT
Start: 2024-05-13 | End: 2024-05-14

## 2024-05-13 RX ORDER — LIDOCAINE HYDROCHLORIDE AND EPINEPHRINE 15; 5 MG/ML; UG/ML
INJECTION, SOLUTION EPIDURAL
Status: COMPLETED | OUTPATIENT
Start: 2024-05-13 | End: 2024-05-13

## 2024-05-13 RX ORDER — ROPIVACAINE HYDROCHLORIDE 2 MG/ML
INJECTION, SOLUTION EPIDURAL; INFILTRATION; PERINEURAL
Status: COMPLETED
Start: 2024-05-13 | End: 2024-05-13

## 2024-05-13 RX ORDER — SODIUM CHLORIDE, SODIUM LACTATE, POTASSIUM CHLORIDE, AND CALCIUM CHLORIDE .6; .31; .03; .02 G/100ML; G/100ML; G/100ML; G/100ML
1000 INJECTION, SOLUTION INTRAVENOUS
Status: DISCONTINUED | OUTPATIENT
Start: 2024-05-13 | End: 2024-05-14

## 2024-05-13 RX ADMIN — ROPIVACAINE HYDROCHLORIDE 200 MG: 2 INJECTION, SOLUTION EPIDURAL; INFILTRATION at 08:45

## 2024-05-13 RX ADMIN — ROPIVACAINE HYDROCHLORIDE: 2 INJECTION, SOLUTION EPIDURAL; INFILTRATION at 15:24

## 2024-05-13 RX ADMIN — ACETAMINOPHEN 1000 MG: 500 TABLET, FILM COATED ORAL at 20:17

## 2024-05-13 RX ADMIN — OXYTOCIN 2 MILLI-UNITS/MIN: 10 INJECTION INTRAVENOUS at 09:12

## 2024-05-13 RX ADMIN — SODIUM CHLORIDE, POTASSIUM CHLORIDE, SODIUM LACTATE AND CALCIUM CHLORIDE: 600; 310; 30; 20 INJECTION, SOLUTION INTRAVENOUS at 07:55

## 2024-05-13 RX ADMIN — FENTANYL CITRATE 100 MCG: 50 INJECTION, SOLUTION INTRAMUSCULAR; INTRAVENOUS at 19:57

## 2024-05-13 RX ADMIN — ROPIVACAINE HYDROCHLORIDE: 2 INJECTION, SOLUTION EPIDURAL; INFILTRATION at 19:06

## 2024-05-13 RX ADMIN — ROPIVACAINE HYDROCHLORIDE: 2 INJECTION, SOLUTION EPIDURAL; INFILTRATION at 23:03

## 2024-05-13 RX ADMIN — CEFAZOLIN 2 G: 2 INJECTION, POWDER, FOR SOLUTION INTRAMUSCULAR; INTRAVENOUS at 20:19

## 2024-05-13 RX ADMIN — MISOPROSTOL 25 MCG: 100 TABLET ORAL at 01:54

## 2024-05-13 RX ADMIN — GENTAMICIN SULFATE 360 MG: 40 INJECTION, SOLUTION INTRAMUSCULAR; INTRAVENOUS at 22:24

## 2024-05-13 RX ADMIN — LIDOCAINE HYDROCHLORIDE,EPINEPHRINE BITARTRATE 5 ML: 15; .005 INJECTION, SOLUTION EPIDURAL; INFILTRATION; INTRACAUDAL; PERINEURAL at 08:42

## 2024-05-13 RX ADMIN — CEFAZOLIN 2 G: 2 INJECTION, POWDER, FOR SOLUTION INTRAMUSCULAR; INTRAVENOUS at 12:11

## 2024-05-13 ASSESSMENT — PAIN DESCRIPTION - PAIN TYPE
TYPE: ACUTE PAIN
TYPE: ACUTE PAIN

## 2024-05-13 NOTE — ANESTHESIA PROCEDURE NOTES
Epidural Block    Date/Time: 5/13/2024 8:29 AM    Performed by: Stewart Bonner M.D.  Authorized by: Stewart Bonner M.D.    Patient Location:  OB  Start Time:  5/13/2024 8:29 AM  End Time:  5/13/2024 8:49 AM  Reason for Block: labor analgesia    patient identified, IV checked, site marked, risks and benefits discussed, surgical consent, monitors and equipment checked and pre-op evaluation    Patient Position:  Sitting  Prep: ChloraPrep, patient draped and sterile technique    Monitoring:  Blood pressure, continuous pulse oximetry and heart rate  Approach:  Midline  Location:  L3-L4  Injection Technique:  CHARLENE saline  Skin infiltration:  Lidocaine  Strength:  1%  Dose:  5ml  Needle Type:  Tuohy  Needle Gauge:  17 G  Needle Length:  3.5 in  Loss of resistance::  7  Catheter Size:  19 G  Catheter at Skin Depth:  11  Test Dose Result:  Negative   25 g pencil point to CSF  2 ml 0.2% ropiv injected

## 2024-05-13 NOTE — PROGRESS NOTES
Labor Progress Note    Sushma Santanaland   38w3d      Subjective:  Was crying with ctxns so got an epidural and feeling much better now. Pitocin is on at 4mu now.     Objective:   Vitals:    05/13/24 0944 05/13/24 0945 05/13/24 1005 05/13/24 1024   BP:  120/76 100/62 98/55   Pulse: 65 85 76 76   Resp:       Temp:       TempSrc:       SpO2:  93%     Weight:       Height:         SVE: 1/75/-3  IUPC placed  [place] q2  FHT: cat 1, moderate variability, no decels  Ext; no calf pain mauro LE    Labs:  Recent Results (from the past 24 hour(s))   Hold Blood Bank Specimen (Not Tested)    Collection Time: 05/12/24 12:45 PM   Result Value Ref Range    Holding Tube - Bb DONE    CBC with differential    Collection Time: 05/12/24 12:45 PM   Result Value Ref Range    WBC 10.9 (H) 4.8 - 10.8 K/uL    RBC 3.91 (L) 4.20 - 5.40 M/uL    Hemoglobin 12.5 12.0 - 16.0 g/dL    Hematocrit 35.7 (L) 37.0 - 47.0 %    MCV 91.3 81.4 - 97.8 fL    MCH 32.0 27.0 - 33.0 pg    MCHC 35.0 32.2 - 35.5 g/dL    RDW 45.5 35.9 - 50.0 fL    Platelet Count 235 164 - 446 K/uL    MPV 10.5 9.0 - 12.9 fL    Neutrophils-Polys 73.90 (H) 44.00 - 72.00 %    Lymphocytes 18.10 (L) 22.00 - 41.00 %    Monocytes 6.20 0.00 - 13.40 %    Eosinophils 0.30 0.00 - 6.90 %    Basophils 0.30 0.00 - 1.80 %    Immature Granulocytes 1.20 (H) 0.00 - 0.90 %    Nucleated RBC 0.00 0.00 - 0.20 /100 WBC    Neutrophils (Absolute) 8.04 (H) 1.82 - 7.42 K/uL    Lymphs (Absolute) 1.97 1.00 - 4.80 K/uL    Monos (Absolute) 0.67 0.00 - 0.85 K/uL    Eos (Absolute) 0.03 0.00 - 0.51 K/uL    Baso (Absolute) 0.03 0.00 - 0.12 K/uL    Immature Granulocytes (abs) 0.13 (H) 0.00 - 0.11 K/uL    NRBC (Absolute) 0.00 K/uL   T.PALLIDUM AB JUAN (Syphilis)    Collection Time: 05/12/24 12:45 PM   Result Value Ref Range    Syphilis, Treponemal Qual Non-Reactive Non-Reactive       Assessment: plan  IUP at 38w3d  Premature rupture of membranes at term - no fever but now ruputred since 3am yesterday so will start abx  prophylactic. IUPC placed to titrate pitocin to adequate mvu's. Fetal head still high. Continue pitocin. Fetal tracing reassuring.       Janet Salas M.D.

## 2024-05-13 NOTE — PROGRESS NOTES
0003 report received, pt feels contractions, epidural was discussed, POC was discussed. Assessment done.  0800 pt had breakfast and is ready to proceed, she is getting very uncomfortable. Hydrating for epidural. Consent was signed. Dr Bonner was called.  1840 pt is very uncomfortable, DR Bonner in, gave a bolus.  1900 pt is still very uncomfortable. Pit was turned down to 5 milliunit. Report given to Cristel FERRELL  1930 DR Salas was called left message regarding late decels and pain that pt is in

## 2024-05-13 NOTE — PROGRESS NOTES
OB MD Progress Note:    Pt doing well, ctx getting painful  VSS, afebrile  FHR category 1  [place] w/ irregular ctx  SVE 1/70/-3  Will start pitocin, pt may eat breakfast and shower first, epidural when she is ready

## 2024-05-13 NOTE — PROGRESS NOTES
MD L&D progress note     S: pt doing well, no complaints    O: VSS afebrile  FHR - 140s, pos accels, neg decels, mod variability, cat 1 FHR  [place] - irregular ctx  SVE - closed on last exam by RN     A: IUP at 38+2wks, Rupture of membranes prior to onset of labor, Unfavorable cervix, cat 1 FHR     P: Continue labor management, fetal monitoring/toco, IV fluids, patient has had her 2nd dose of Cytotec so far, due for next exam in 2 hours, will probably need a 3rd dose, may ambulate

## 2024-05-13 NOTE — PROGRESS NOTES
1900: report received from Ginger FERRELL, assumed care of patient.  RN to bs, report received. Pt denies needs right now. Pt reports small amount of  leaking of clear fluid.  Pt denies vaginal bleeding. Pt reports fetal movement. Pt reports occasional UC's.  Pt sitting up and visiting with friends and family.      2140: rn to bs, sve closed/50/-5.  Pt reports occasional UC's. Misoprostil given.     0000: report to Cristel FERRELL

## 2024-05-13 NOTE — CARE PLAN
The patient is Stable - Low risk of patient condition declining or worsening         Progress made toward(s) clinical / shift goals:    Problem: Knowledge Deficit - L&D  Goal: Patient and family/caregivers will demonstrate understanding of plan of care, disease process/condition, diagnostic tests and medications  Outcome: Progressing  Note: POC is discussed with pt and pt was encouraged to ask questions.     Problem: Pain  Goal: Patient's pain will be alleviated or reduced to the patient’s comfort goal  Flowsheets (Taken 5/13/2024 1006)  Pain Rating Scale (NPRS): 0  OB Pain Level: 0-No Pain  OB Pain Intervention:   Education   Epidural  Note: Pt received epidural       Patient is not progressing towards the following goals:

## 2024-05-14 ENCOUNTER — ANESTHESIA EVENT (OUTPATIENT)
Dept: OBGYN | Facility: MEDICAL CENTER | Age: 29
End: 2024-05-14
Payer: MEDICAID

## 2024-05-14 ENCOUNTER — ANESTHESIA (OUTPATIENT)
Dept: OBGYN | Facility: MEDICAL CENTER | Age: 29
End: 2024-05-14
Payer: MEDICAID

## 2024-05-14 LAB
BASOPHILS # BLD AUTO: 0.2 % (ref 0–1.8)
BASOPHILS # BLD: 0.05 K/UL (ref 0–0.12)
EOSINOPHIL # BLD AUTO: 0 K/UL (ref 0–0.51)
EOSINOPHIL NFR BLD: 0 % (ref 0–6.9)
ERYTHROCYTE [DISTWIDTH] IN BLOOD BY AUTOMATED COUNT: 46.3 FL (ref 35.9–50)
ERYTHROCYTE [DISTWIDTH] IN BLOOD BY AUTOMATED COUNT: 47 FL (ref 35.9–50)
HCT VFR BLD AUTO: 26.6 % (ref 37–47)
HCT VFR BLD AUTO: 33.2 % (ref 37–47)
HGB BLD-MCNC: 11.6 G/DL (ref 12–16)
HGB BLD-MCNC: 9 G/DL (ref 12–16)
IMM GRANULOCYTES # BLD AUTO: 0.31 K/UL (ref 0–0.11)
IMM GRANULOCYTES NFR BLD AUTO: 1.1 % (ref 0–0.9)
LYMPHOCYTES # BLD AUTO: 1.34 K/UL (ref 1–4.8)
LYMPHOCYTES NFR BLD: 4.7 % (ref 22–41)
MCH RBC QN AUTO: 31.3 PG (ref 27–33)
MCH RBC QN AUTO: 32.1 PG (ref 27–33)
MCHC RBC AUTO-ENTMCNC: 33.8 G/DL (ref 32.2–35.5)
MCHC RBC AUTO-ENTMCNC: 34.9 G/DL (ref 32.2–35.5)
MCV RBC AUTO: 92 FL (ref 81.4–97.8)
MCV RBC AUTO: 92.4 FL (ref 81.4–97.8)
MONOCYTES # BLD AUTO: 1.61 K/UL (ref 0–0.85)
MONOCYTES NFR BLD AUTO: 5.6 % (ref 0–13.4)
NEUTROPHILS # BLD AUTO: 25.44 K/UL (ref 1.82–7.42)
NEUTROPHILS NFR BLD: 88.4 % (ref 44–72)
NRBC # BLD AUTO: 0 K/UL
NRBC BLD-RTO: 0 /100 WBC (ref 0–0.2)
PATHOLOGY CONSULT NOTE: NORMAL
PLATELET # BLD AUTO: 157 K/UL (ref 164–446)
PLATELET # BLD AUTO: 227 K/UL (ref 164–446)
PMV BLD AUTO: 11 FL (ref 9–12.9)
PMV BLD AUTO: 11.1 FL (ref 9–12.9)
RBC # BLD AUTO: 2.88 M/UL (ref 4.2–5.4)
RBC # BLD AUTO: 3.61 M/UL (ref 4.2–5.4)
WBC # BLD AUTO: 20.5 K/UL (ref 4.8–10.8)
WBC # BLD AUTO: 28.8 K/UL (ref 4.8–10.8)

## 2024-05-14 PROCEDURE — 10D17ZZ EXTRACTION OF PRODUCTS OF CONCEPTION, RETAINED, VIA NATURAL OR ARTIFICIAL OPENING: ICD-10-PCS | Performed by: OBSTETRICS & GYNECOLOGY

## 2024-05-14 PROCEDURE — 0KQM0ZZ REPAIR PERINEUM MUSCLE, OPEN APPROACH: ICD-10-PCS | Performed by: OBSTETRICS & GYNECOLOGY

## 2024-05-14 PROCEDURE — 10H073Z INSERTION OF MONITORING ELECTRODE INTO PRODUCTS OF CONCEPTION, VIA NATURAL OR ARTIFICIAL OPENING: ICD-10-PCS | Performed by: OBSTETRICS & GYNECOLOGY

## 2024-05-14 PROCEDURE — 4A1H74Z MONITORING OF PRODUCTS OF CONCEPTION, CARDIAC ELECTRICAL ACTIVITY, VIA NATURAL OR ARTIFICIAL OPENING: ICD-10-PCS | Performed by: OBSTETRICS & GYNECOLOGY

## 2024-05-14 RX ORDER — HYDROMORPHONE HYDROCHLORIDE 1 MG/ML
0.2 INJECTION, SOLUTION INTRAMUSCULAR; INTRAVENOUS; SUBCUTANEOUS
Status: DISCONTINUED | OUTPATIENT
Start: 2024-05-14 | End: 2024-05-14 | Stop reason: HOSPADM

## 2024-05-14 RX ORDER — OXYCODONE HCL 5 MG/5 ML
5 SOLUTION, ORAL ORAL
Status: DISCONTINUED | OUTPATIENT
Start: 2024-05-14 | End: 2024-05-14 | Stop reason: HOSPADM

## 2024-05-14 RX ORDER — SIMETHICONE 125 MG
125 TABLET,CHEWABLE ORAL 4 TIMES DAILY PRN
Status: DISCONTINUED | OUTPATIENT
Start: 2024-05-14 | End: 2024-05-16 | Stop reason: HOSPADM

## 2024-05-14 RX ORDER — MISOPROSTOL 200 UG/1
600 TABLET ORAL
Status: DISCONTINUED | OUTPATIENT
Start: 2024-05-14 | End: 2024-05-16 | Stop reason: HOSPADM

## 2024-05-14 RX ORDER — SODIUM CHLORIDE, SODIUM LACTATE, POTASSIUM CHLORIDE, CALCIUM CHLORIDE 600; 310; 30; 20 MG/100ML; MG/100ML; MG/100ML; MG/100ML
INJECTION, SOLUTION INTRAVENOUS PRN
Status: DISCONTINUED | OUTPATIENT
Start: 2024-05-14 | End: 2024-05-16 | Stop reason: HOSPADM

## 2024-05-14 RX ORDER — DOCUSATE SODIUM 100 MG/1
100 CAPSULE, LIQUID FILLED ORAL 2 TIMES DAILY PRN
Status: DISCONTINUED | OUTPATIENT
Start: 2024-05-14 | End: 2024-05-16 | Stop reason: HOSPADM

## 2024-05-14 RX ORDER — HYDROMORPHONE HYDROCHLORIDE 1 MG/ML
0.1 INJECTION, SOLUTION INTRAMUSCULAR; INTRAVENOUS; SUBCUTANEOUS
Status: DISCONTINUED | OUTPATIENT
Start: 2024-05-14 | End: 2024-05-14 | Stop reason: HOSPADM

## 2024-05-14 RX ORDER — ONDANSETRON 2 MG/ML
4 INJECTION INTRAMUSCULAR; INTRAVENOUS EVERY 6 HOURS PRN
Status: DISCONTINUED | OUTPATIENT
Start: 2024-05-14 | End: 2024-05-14 | Stop reason: HOSPADM

## 2024-05-14 RX ORDER — MISOPROSTOL 200 UG/1
TABLET ORAL
Status: COMPLETED
Start: 2024-05-14 | End: 2024-05-14

## 2024-05-14 RX ORDER — HYDROMORPHONE HYDROCHLORIDE 1 MG/ML
0.4 INJECTION, SOLUTION INTRAMUSCULAR; INTRAVENOUS; SUBCUTANEOUS
Status: DISCONTINUED | OUTPATIENT
Start: 2024-05-14 | End: 2024-05-14 | Stop reason: HOSPADM

## 2024-05-14 RX ORDER — ROPIVACAINE HYDROCHLORIDE 2 MG/ML
INJECTION, SOLUTION EPIDURAL; INFILTRATION PRN
Status: DISCONTINUED | OUTPATIENT
Start: 2024-05-14 | End: 2024-05-14 | Stop reason: SURG

## 2024-05-14 RX ORDER — OXYCODONE HCL 5 MG/5 ML
10 SOLUTION, ORAL ORAL
Status: DISCONTINUED | OUTPATIENT
Start: 2024-05-14 | End: 2024-05-14 | Stop reason: HOSPADM

## 2024-05-14 RX ORDER — KETOROLAC TROMETHAMINE 15 MG/ML
INJECTION, SOLUTION INTRAMUSCULAR; INTRAVENOUS PRN
Status: DISCONTINUED | OUTPATIENT
Start: 2024-05-14 | End: 2024-05-14 | Stop reason: SURG

## 2024-05-14 RX ORDER — CALCIUM CARBONATE 500 MG/1
1000 TABLET, CHEWABLE ORAL EVERY 6 HOURS PRN
Status: DISCONTINUED | OUTPATIENT
Start: 2024-05-14 | End: 2024-05-16 | Stop reason: HOSPADM

## 2024-05-14 RX ORDER — HYDRALAZINE HYDROCHLORIDE 20 MG/ML
5 INJECTION INTRAMUSCULAR; INTRAVENOUS
Status: DISCONTINUED | OUTPATIENT
Start: 2024-05-14 | End: 2024-05-14 | Stop reason: HOSPADM

## 2024-05-14 RX ORDER — EPHEDRINE SULFATE 50 MG/ML
INJECTION, SOLUTION INTRAVENOUS PRN
Status: DISCONTINUED | OUTPATIENT
Start: 2024-05-14 | End: 2024-05-14 | Stop reason: SURG

## 2024-05-14 RX ORDER — ACETAMINOPHEN 10 MG/ML
1000 INJECTION, SOLUTION INTRAVENOUS ONCE
Qty: 100 ML | Refills: 0 | Status: COMPLETED | OUTPATIENT
Start: 2024-05-14 | End: 2024-05-14

## 2024-05-14 RX ORDER — IBUPROFEN 800 MG/1
800 TABLET ORAL EVERY 8 HOURS PRN
Status: DISCONTINUED | OUTPATIENT
Start: 2024-05-14 | End: 2024-05-16 | Stop reason: HOSPADM

## 2024-05-14 RX ORDER — EPHEDRINE SULFATE 50 MG/ML
5 INJECTION, SOLUTION INTRAVENOUS
Status: DISCONTINUED | OUTPATIENT
Start: 2024-05-14 | End: 2024-05-14 | Stop reason: HOSPADM

## 2024-05-14 RX ORDER — LIDOCAINE HCL/EPINEPHRINE/PF 2%-1:200K
VIAL (ML) INJECTION PRN
Status: DISCONTINUED | OUTPATIENT
Start: 2024-05-14 | End: 2024-05-14 | Stop reason: SURG

## 2024-05-14 RX ORDER — HALOPERIDOL 5 MG/ML
1 INJECTION INTRAMUSCULAR
Status: DISCONTINUED | OUTPATIENT
Start: 2024-05-14 | End: 2024-05-14 | Stop reason: HOSPADM

## 2024-05-14 RX ORDER — SODIUM CHLORIDE, SODIUM LACTATE, POTASSIUM CHLORIDE, CALCIUM CHLORIDE 600; 310; 30; 20 MG/100ML; MG/100ML; MG/100ML; MG/100ML
INJECTION, SOLUTION INTRAVENOUS CONTINUOUS
Status: DISCONTINUED | OUTPATIENT
Start: 2024-05-14 | End: 2024-05-16 | Stop reason: HOSPADM

## 2024-05-14 RX ORDER — SODIUM CHLORIDE, SODIUM LACTATE, POTASSIUM CHLORIDE, CALCIUM CHLORIDE 600; 310; 30; 20 MG/100ML; MG/100ML; MG/100ML; MG/100ML
INJECTION, SOLUTION INTRAVENOUS
Status: DISCONTINUED | OUTPATIENT
Start: 2024-05-14 | End: 2024-05-14 | Stop reason: SURG

## 2024-05-14 RX ORDER — ACETAMINOPHEN 500 MG
1000 TABLET ORAL EVERY 6 HOURS PRN
Status: DISCONTINUED | OUTPATIENT
Start: 2024-05-14 | End: 2024-05-16 | Stop reason: HOSPADM

## 2024-05-14 RX ORDER — DIPHENHYDRAMINE HYDROCHLORIDE 50 MG/ML
12.5 INJECTION INTRAMUSCULAR; INTRAVENOUS
Status: DISCONTINUED | OUTPATIENT
Start: 2024-05-14 | End: 2024-05-14 | Stop reason: HOSPADM

## 2024-05-14 RX ORDER — VITAMIN A ACETATE, BETA CAROTENE, ASCORBIC ACID, CHOLECALCIFEROL, .ALPHA.-TOCOPHEROL ACETATE, DL-, THIAMINE MONONITRATE, RIBOFLAVIN, NIACINAMIDE, PYRIDOXINE HYDROCHLORIDE, FOLIC ACID, CYANOCOBALAMIN, CALCIUM CARBONATE, FERROUS FUMARATE, ZINC OXIDE, CUPRIC OXIDE 3080; 12; 120; 400; 1; 1.84; 3; 20; 22; 920; 25; 200; 27; 10; 2 [IU]/1; UG/1; MG/1; [IU]/1; MG/1; MG/1; MG/1; MG/1; MG/1; [IU]/1; MG/1; MG/1; MG/1; MG/1; MG/1
1 TABLET, FILM COATED ORAL
Status: DISCONTINUED | OUTPATIENT
Start: 2024-05-15 | End: 2024-05-16 | Stop reason: HOSPADM

## 2024-05-14 RX ORDER — OXYCODONE HYDROCHLORIDE 5 MG/1
5 TABLET ORAL EVERY 4 HOURS PRN
Status: DISCONTINUED | OUTPATIENT
Start: 2024-05-14 | End: 2024-05-16 | Stop reason: HOSPADM

## 2024-05-14 RX ADMIN — KETOROLAC TROMETHAMINE 15 MG: 15 INJECTION, SOLUTION INTRAMUSCULAR; INTRAVENOUS at 07:53

## 2024-05-14 RX ADMIN — AMPICILLIN SODIUM 2000 MG: 2 INJECTION, POWDER, FOR SOLUTION INTRAVENOUS at 06:40

## 2024-05-14 RX ADMIN — ACETAMINOPHEN 1000 MG: 10 INJECTION INTRAVENOUS at 05:01

## 2024-05-14 RX ADMIN — FENTANYL CITRATE 50 MCG: 50 INJECTION, SOLUTION INTRAMUSCULAR; INTRAVENOUS at 07:40

## 2024-05-14 RX ADMIN — CEFAZOLIN 2 G: 2 INJECTION, POWDER, FOR SOLUTION INTRAMUSCULAR; INTRAVENOUS at 06:37

## 2024-05-14 RX ADMIN — ROPIVACAINE HYDROCHLORIDE: 2 INJECTION, SOLUTION EPIDURAL; INFILTRATION at 02:55

## 2024-05-14 RX ADMIN — SODIUM CHLORIDE, POTASSIUM CHLORIDE, SODIUM LACTATE AND CALCIUM CHLORIDE: 600; 310; 30; 20 INJECTION, SOLUTION INTRAVENOUS at 07:23

## 2024-05-14 RX ADMIN — FENTANYL CITRATE 50 MCG: 50 INJECTION, SOLUTION INTRAMUSCULAR; INTRAVENOUS at 08:41

## 2024-05-14 RX ADMIN — OXYTOCIN 999 ML/HR: 10 INJECTION INTRAVENOUS at 09:36

## 2024-05-14 RX ADMIN — ACETAMINOPHEN 1000 MG: 500 TABLET, FILM COATED ORAL at 13:12

## 2024-05-14 RX ADMIN — ACETAMINOPHEN 1000 MG: 500 TABLET, FILM COATED ORAL at 19:31

## 2024-05-14 RX ADMIN — MISOPROSTOL 800 MCG: 200 TABLET ORAL at 08:46

## 2024-05-14 RX ADMIN — LIDOCAINE HYDROCHLORIDE AND EPINEPHRINE 5 ML: 20; 5 INJECTION, SOLUTION EPIDURAL; INFILTRATION; INTRACAUDAL; PERINEURAL at 07:20

## 2024-05-14 RX ADMIN — LIDOCAINE HYDROCHLORIDE AND EPINEPHRINE 3 ML: 20; 5 INJECTION, SOLUTION EPIDURAL; INFILTRATION; INTRACAUDAL; PERINEURAL at 07:37

## 2024-05-14 RX ADMIN — AMPICILLIN SODIUM 2000 MG: 2 INJECTION, POWDER, FOR SOLUTION INTRAVENOUS at 02:08

## 2024-05-14 RX ADMIN — GENTAMICIN SULFATE 360 MG: 40 INJECTION, SOLUTION INTRAMUSCULAR; INTRAVENOUS at 23:05

## 2024-05-14 RX ADMIN — AMPICILLIN SODIUM 2000 MG: 2 INJECTION, POWDER, FOR SOLUTION INTRAVENOUS at 19:36

## 2024-05-14 RX ADMIN — OXYTOCIN 125 ML/HR: 10 INJECTION INTRAVENOUS at 11:13

## 2024-05-14 RX ADMIN — ROPIVACAINE HYDROCHLORIDE 10 ML: 5 INJECTION, SOLUTION EPIDURAL; INFILTRATION; PERINEURAL at 01:38

## 2024-05-14 RX ADMIN — FENTANYL CITRATE 50 MCG: 50 INJECTION, SOLUTION INTRAMUSCULAR; INTRAVENOUS at 08:44

## 2024-05-14 RX ADMIN — OXYTOCIN 20 UNITS: 10 INJECTION INTRAVENOUS at 06:33

## 2024-05-14 RX ADMIN — AMPICILLIN SODIUM 2000 MG: 2 INJECTION, POWDER, FOR SOLUTION INTRAVENOUS at 13:13

## 2024-05-14 RX ADMIN — OXYTOCIN 999 ML/HR: 10 INJECTION INTRAVENOUS at 10:15

## 2024-05-14 RX ADMIN — LIDOCAINE HYDROCHLORIDE AND EPINEPHRINE 5 ML: 20; 5 INJECTION, SOLUTION EPIDURAL; INFILTRATION; INTRACAUDAL; PERINEURAL at 07:26

## 2024-05-14 RX ADMIN — LIDOCAINE HYDROCHLORIDE AND EPINEPHRINE 5 ML: 20; 5 INJECTION, SOLUTION EPIDURAL; INFILTRATION; INTRACAUDAL; PERINEURAL at 07:23

## 2024-05-14 RX ADMIN — FENTANYL CITRATE 50 MCG: 50 INJECTION, SOLUTION INTRAMUSCULAR; INTRAVENOUS at 07:20

## 2024-05-14 RX ADMIN — EPHEDRINE SULFATE 10 MG: 50 INJECTION, SOLUTION INTRAVENOUS at 08:26

## 2024-05-14 RX ADMIN — OXYTOCIN 125 ML/HR: 10 INJECTION INTRAVENOUS at 11:14

## 2024-05-14 RX ADMIN — LIDOCAINE HYDROCHLORIDE 20 ML: 10 INJECTION, SOLUTION INFILTRATION; PERINEURAL at 06:32

## 2024-05-14 RX ADMIN — SODIUM BICARBONATE 2 ML: 84 INJECTION, SOLUTION INTRAVENOUS at 07:20

## 2024-05-14 ASSESSMENT — PAIN DESCRIPTION - PAIN TYPE
TYPE: ACUTE PAIN

## 2024-05-14 ASSESSMENT — PAIN SCALES - GENERAL
PAIN_LEVEL: 3
PAIN_LEVEL: 0

## 2024-05-14 NOTE — PROGRESS NOTES
Late entry due to patient care:     2002 Main OR charge called by this RN to send OR tech's to L+D to be on standby to open OR1 for a possible emergent c/s related to FHT concerns. OB OR tech in an active case. Second anesthesia provider Dr Obrien called to the unit to assess pt's epidural and to be on standby for possible emergent C/S. FHT reviewed with Dr Salas, C/S not called at this time, see MD progress note.     0546 Main OR charge called again by this RN to send OR tech's to L+D to be on standby for possible emergent C/S. OB OR tech currently in the OR with an active case. Dr Salas at bedside pushing with patient with primary RN. NICU on unit for standby for delivery.

## 2024-05-14 NOTE — PROGRESS NOTES
Labor Progress Note    Sushma Verde   38w4d      Subjective:  The fetal tracing eventually looked good enough to restart pitocin augmentation slowly.   She did have a run of repetitive late appearing decels that resolved. She is now getting more uncomfortable again and awaiting an epidural bolus.     Objective:   Vitals:    24 2345 24 0005 24 0025 24 0045   BP: 121/57 114/61 102/56 118/61   Pulse: 66 68 62 (!) 59   Resp:       Temp:       TempSrc:       SpO2:       Weight:       Height:         SVE 6/90/0 per RN  FHT:Moderate variability present [id number] with variables present mild now.   Q 3-4 min    Assessment:   IUP at 38w4d   prolonged rupture of membranes at term  Making progress now and fetal tracing overall reassuring  Will bolus epidural and anticipate       Janet Salas M.D.

## 2024-05-14 NOTE — ANESTHESIA TIME REPORT
Anesthesia Start and Stop Event Times       Date Time Event    5/13/2024 0811 Ready for Procedure     0826 Anesthesia Start    5/14/2024 0601 Anesthesia Stop          Responsible Staff  05/13/24 to 05/14/24      Name Role Begin End    Stewart Bonner M.D. Anesth 0826 0601          Overtime Reason:  no overtime (within assigned shift)    Comments:

## 2024-05-14 NOTE — ANESTHESIA POSTPROCEDURE EVALUATION
Patient: Sushma Verde    Procedure Summary       Date: 05/13/24 Room / Location:     Anesthesia Start: 0826 Anesthesia Stop: 05/14/24 0601    Procedure: Labor Epidural Diagnosis:     Scheduled Providers:  Responsible Provider: Stewart Bonner M.D.    Anesthesia Type: epidural ASA Status: 2            Final Anesthesia Type: epidural  Last vitals  BP   Blood Pressure: 113/60    Temp   37.9 °C (100.2 °F)    Pulse   85   Resp   18    SpO2   93 %      Anesthesia Post Evaluation    Patient location during evaluation: PACU  Patient participation: complete - patient participated  Level of consciousness: awake and alert  Pain score: 0    Airway patency: patent  Anesthetic complications: no  Cardiovascular status: hemodynamically stable  Respiratory status: acceptable  Hydration status: euvolemic    PONV: none          No notable events documented.     Nurse Pain Score: 2 (NPRS)

## 2024-05-14 NOTE — PROGRESS NOTES
Patient brought from labor and delivery via wheelchair.  Patient oriented to room and surroundings.. 0-10 pain scale and pain management discussed. Fundus firm with light lochia. IV infusing without redness or swelling.  Family at bedside.  Patient encouraged to call before getting out of bed until stable.  Patient encouraged to call for any needs. Pt started pumping for infant in NICU

## 2024-05-14 NOTE — PROGRESS NOTES
Labor Progress Note    Sushma Santanaland   38w3d      Subjective:  Reviewed tracing around 6:30pm and asked Rn to decrease pitocin as tachysystole was noted.  At that time I  was notified she was 4cm and having a few decelerations. At 7:30pm I was notified that her temp was increasing and was 99.5 and baby fetal baseline was increased to 160's. She started having a lot of pain : and epidural was replaced to get her more comfortable.     At this time I was notified that RN left a voicemail on my phone which I did not get at that time asking to come evaluate her. Her temp reached 100.4 at 2005. She was still having tachysystole at this time despite the pitocin being off.     She was checked on my arrival after new epidural was placed and was more comfortable and she was 4/90/-1  (caput at 0). Vizcaino bulb ballon was pushed up as it was lower than the head.   She was given 1000mg tylenol and IV fluid bolus of D5LR  and then the tracing was reassessed.     Objective:   Vitals:    05/13/24 2105 05/13/24 2125 05/13/24 2145 05/13/24 2205   BP: 101/56 124/55 103/56 105/57   Pulse: 73 88 60 60   Resp:       Temp:       TempSrc:       SpO2:       Weight:       Height:       No results found for this or any previous visit (from the past 24 hour(s)).    Gen: getting very tired/and was in a considerable amt of pain until the epidural was replaced  SVE: 4/90/-1  The IUPC was flushed 2x and then eventually I replaced it and seems to be working better now.   FHT: was in the 180's with min variability and a few late appearing decelerations until fever came down and resuscitation done/epidural replaced - fluid bolus- tylenol and IV abx going. Subsequently the FHT has come down to 150's with moderate variability and no late decelerations and once again was category 1.   She did have tachysystole for some time: pitocin was for for a while/IUPC was replaced and now tracing improved. The tracing has been cat 1 for some time now and  still with moderate variability.   Comobabi: was q1-2 min and now q 2-4 min    Assessment: plan  IUP at 38w3d  Prolonged rupture of membranes  Chorioamnionitis: she was on ancef prophylaxis for prolonged rupture and will now change her to amp/gent. The ancef had just been hung when I changed the antibiotics so will finish out this dose and then change it.   Fetal tracing improved - will wait for contractions to space out and then restart pitocin          Janet Salas M.D.

## 2024-05-14 NOTE — PROGRESS NOTES
Late entry note: see full dictation.     Around 4am she started having deep variables and an amnioinfusion was done .    She became complete and started pushing around 5am - deep variables were still present but still with variability and she was pushing well.    Around 5:30 I was not comfortable with the fetal tracing being as broken as it was and being an external monitor as the patient had pleaded for the FSC to be removed a few hours prior as it was really bothering her (the wire being between her legs) with decreased variability, deep variables and broken tracing I insisted I need to place it again.  I  was at bedside  and I placed an FSC again. The fetal head was very low and with the caput/molding present I was not comfortable with a vacuum delivery. With her pushing the IUPC had also pushed out - and I asked the RN to place at least external toco on to see her contractions. She pushed and then delivered within a few more pushes.     Female infant over a 2nd degree midline vaginal laceration,  nuchal x 1 delivered through and reduced after delivery. RT was present at delivery. Cord was immediately clamped and cut and [facility]   Cord gasses were collected by myself and asked to be sent - I dont see them in her or babys chart even now though.   2nd degree lac was repaired while waiting for placenta. After 40 minutes the placenta had not delivered and manual extraction was  attempted several times in the room - After unsuccessful attempts she was then taken to the OR for retained placenta.   Total ebl in room 300cc. Sponge laps/needles correct.     The baby was intubated in the room and taken to NICU. FOB went with baby.     R/b/a were discussed with the patient and her mother at the bedside for suction D&C. Risk for bleeding /infectio/pain/ injury to uterus or need for blood transfusion. All questions were answered to their satisfaction.

## 2024-05-14 NOTE — ANESTHESIA PREPROCEDURE EVALUATION
Case: [id number] Anesthesia Start Date/Time: 05/14/24 0723    Procedure: DILATION AND CURETTAGE (Vagina )    Pre-op diagnosis: Retained placenta    Location: [facility] OR 03 / SURGERY LABOR AND DELIVERY    Surgeons: Janet Salas M.D.          To OR for D&C for retained placenta after vaginal delivery. Epidural in place and working well. NPO>8hrs.    Relevant Problems   No relevant active problems       Physical Exam    Airway   Mallampati: II  TM distance: >3 FB  Neck ROM: full       Cardiovascular - normal exam  Rhythm: regular  Rate: normal  (-) murmur     Dental - normal exam           Pulmonary - normal exam  Breath sounds clear to auscultation     Abdominal    Neurological - normal exam                   Anesthesia Plan    ASA 2       (Pt with existing working epidural )          Postoperative Plan: Postoperative administration of opioids is intended.    Pertinent diagnostic labs and testing reviewed    Informed Consent:    Anesthetic plan and risks discussed with patient.

## 2024-05-14 NOTE — LACTATION NOTE
Initial Consult:     History:    at 38w+4d.  NRFHT during labor.  Infant admitted to NICU following delivery for resuscitation, started cooling protocol.  MOB with retained placenta requiring D&C.      History of BF: none.  This is MOBs first baby.      MOB started pumping following delivery using [employer]meda Platinum breast pump.  Beginning at 80cpm decrease to 60cpm at 2min.  Suction to comfort between 20-40%.  Total pump time: 15min.  Repeat every 3hrs. Hand express 1-2min after each pump session.    Reviewed how to set up, store and clean pump parts and provided with oral swabs.      Plan:  Continue to pump q3hrs followed with hand expression.

## 2024-05-14 NOTE — OP REPORT
DATE OF SERVICE:  05/14/2024     PREOPERATIVE DIAGNOSES:  Retained placenta following vaginal delivery.     POSTOPERATIVE DIAGNOSES:  Retained placenta following vaginal delivery.     PROCEDURE:  Suction, dilation and curettage, ultrasound-guided.     SURGEON:  Janet Salas MD     ASSISTANT:  Christopher Gibbs MD     ANESTHESIOLOGIST:  Deysi Rodriguez MD     ANESTHESIA:  Epidural.     COMPLICATIONS:  None.     ESTIMATED BLOOD LOSS:  700 mL.     FINDINGS:  Retained placenta. Most was able to be removed manually. There was a very small area up near the fundus that despite multiple attempts manually and with suction curettes could not be removed as the instruments did not go that high even with fundal pressure from assistant. Ultrasound guided. Tranexaminic acid was given prior to the procedure and cytotec 800 mcg placed rectally following the procedure.     Procedure:   She was taken to the operating room and Dr Rodriguez bolused up her epidural appropriately. She was prepped and draped in a normal sterile fashion in a dorsal lithotomy position. Time out was called to identify patient and confirm she was already on IV antibiotics and did not require another dose at that time.     Manual extraction in the operating room of most of the placenta was  able to be performed.  Multiple passes were attempted and made until it was thought that all the placenta was removed. It was very adherent to the uterine wall.   Dr. Gibbs   guided the ultrasound and there was a scant amount of possible products of   conception up in the fundal area.  Using ultrasound guidance, a 10 mm curved   flex tip suction was introduced and several passes were made again following   to try to get to the fundal area.  It was very difficult to get to her fundal   area due to the shape of her uterus even with Dr Gibbs applying fundal pressure in-between ultrasound guidance.  After several unsuccessful attempts the   non-flex more firm 10 mm curette was then  introduced and it was    impossible to get to the exact area where there could be some possible scant spot  retained products. After multiple attempts made - the instruments even sharp curettes were just not long enough to get to that location.      She was not actively bleeding or hemorrhaging.  We watched the area for some time and her uterus was not collecting with blood.  She received tranexamic acid prior to the start of   surgery and 800 mcg of Cytotec rectally.  Sponge, laps and needle counts were   correct.  She was taken back to her labor room stable.  We will get a blood   count now and watch her bleeding.      Dr. Gibbs is on-call today and is obviously   aware since he was in the operating room with me to watch for any signs of   bleeding or hemorrhage.  We ended the procedure.  Total estimated blood loss   in the operating room 700 mL The patient was taken back to her exam room.  She   will be monitored for any further bleeding.  She did receive check tranexamic   acid prior to start of surgery and she did receive Cytotec 800 mcg rectally   post-surgery.  She was already on IV antibiotics for chorioamnionitis and   these will also continue.        ______________________________  MD ANDREW MICHELLE/ULISES    DD:  05/14/2024 09:56  DT:  05/14/2024 10:14    Job#:  [id number]

## 2024-05-14 NOTE — ANESTHESIA TIME REPORT
Anesthesia Start and Stop Event Times       Date Time Event    5/14/2024 0701 Ready for Procedure     0723 Anesthesia Start          Responsible Staff  05/14/24      Name Role Begin End    Deysi Rodriguez M.D. Anesth 0723           Overtime Reason:  no overtime (within assigned shift)    Comments:

## 2024-05-14 NOTE — PROGRESS NOTES
"Pharmacy Gentamicin Kinetics Note for 5/14/2024     28 y.o. female on Gentamicin day # 2    Gentamicin Indication: Other (comment) (PROM)     Provider specified end date: 05/15/24    Active Antibiotics (From admission, onward)      Ordered     Ordering Provider       Tue May 14, 2024 12:01 PM    05/14/24 1201  ampicillin (Omnipen) 2,000 mg in  mL IVPB  EVERY 6 HOURS        Note to Pharmacy: She has a dose of ancef going in right now (2030) - lets change to amp and gent now that she has chorioamnionitis please. Can you please time this 6hrs from now.    Janet Salas M.D.    05/14/24 1201  gentamicin (Garamycin) 360 mg in  mL IVPB  EVERY 24 HOURS         Janet Salas M.D.    05/14/24 1201  MD Alert...Gentamicin per Pharmacy  PHARMACY TO DOSE        Question:  Indication(s) for aminoglycoside?  Answer:  Intra-abdominal infection    Janet Salas M.D.            Dosing Weight: 72.7 kg (160 lb 4.4 oz)    Admission History: Admitted on 5/12/2024 for Indication for care in labor or delivery [O75.9]   Pertinent history: Patient admitted for active labor. Patient with prolong rupture of membranes and concern for chorioamnionitis, so ppx antibiotics initiated.    Allergies:     Patient has no known allergies.     Pertinent cultures to date:      Results       ** No results found for the last 336 hours. **            Labs:    CrCl cannot be calculated (No successful lab value found.).  Recent Labs     05/12/24  1245 05/14/24  0906   WBC 10.9* 28.8*   NEUTSPOLYS 73.90* 88.40*     No results for input(s): \"BUN\", \"CREATININE\", \"ALBUMIN\" in the last 72 hours.  No results for input(s): \"GENTTROUGH\", \"GENTPEAK\", \"GENTRANDOM\" in the last 72 hours.    Intake/Output Summary (Last 24 hours) at 5/14/2024 1318  Last data filed at 5/14/2024 1029  Gross per 24 hour   Intake 864.36 ml   Output 1980 ml   Net -1115.64 ml     /82   Pulse 84   Temp 36.4 °C (97.6 °F) (Temporal)   Resp 17   Ht 1.626 m (5' 4\")   Wt 90.7 " kg (200 lb)   SpO2 96%  Temp (24hrs), Av.1 °C (98.7 °F), Min:36.4 °C (97.6 °F), Max:38 °C (100.4 °F)      List concerns for Gentamicin clearance:     None    A/P:     - Gentamicin dose: 5 mg/kg Q24H    - Next gentamicin level: NA    - Goal trough: Undetectable    - Comments: No concerns for renal function.  Will dose 360 mg (5 mg/kg based on dosing weight) q24h per protocol.  Will order a level if therapy continued > 3 days.  Pharmacy will monitor and adjust dosing if needed.      Jerrod Sepulveda, MilenaD

## 2024-05-14 NOTE — H&P
DATE OF ADMISSION:  2024     HISTORY OF PRESENT ILLNESS:  The patient delivered a vaginal delivery of a   female infant early this morning on , subsequently had a retained   placenta, but manual extraction was unsuccessful at the bedside.  She had a   working epidural.  Risks, benefits and alternatives were thoroughly discussed   with her and the mother at the bedside and decision was made to move to the   operating room for suction, dilation and curettage.     PAST MEDICAL HISTORY:  History of seizures childhood  the past, anemia.     PAST SURGICAL HISTORY:  ACL repair, dilation and curettage, wisdom teeth   removal, blood clot removal of the uterus after a miscarriage.     OBSTETRICAL HISTORY:  1.  Elective .  2.  Spontaneous .     GYNECOLOGIC HISTORY:  Abnormal Pap in the past, but normal with her last one,   HPV positive.     SOCIAL HISTORY:  No alcohol, tobacco or history of drug use in pregnancy. Hx of etoh use prior to pregnancy.     FAMILY HISTORY:  Noncontributory to present condition.     CURRENT MEDICATIONS:  Prenatal vitamins.     ALLERGIES:  None.     PHYSICAL EXAMINATION:  VITAL SIGNS:  Stable.  She was currently afebrile.  GENERAL:  Awake, alert, oriented, no acute distress.  ABDOMEN:  Soft, gravid, nontender, nondistended.  EXTREMITIES:  1+ edema bilateral lower extremities, retained placenta present   not actively hemorrhaging.     ASSESSMENT AND PLAN:    Status post spontaneous vaginal delivery with retained placenta.      Risks, benefits and alternatives were thoroughly discussed with her   and the mother at the bedside.  Risks including bleeding, infection, pain,   injury to uterus itself, possible need for blood transfusion if hemorrhage   occurs.  All questions were answered to their satisfaction and she was taken   to the operating room with a working epidural. She received Tranexamic acid 1g IV prior to the start of procedure.          ______________________________  MD ELIZABETH MICHELLE    DD:  05/14/2024 09:49  DT:  05/14/2024 10:16    Job#:  [id number]

## 2024-05-14 NOTE — PROGRESS NOTES
0700- Report received from GHASSAN Fam. POC discussed and assumed. Pt with retained placenta and orders to go to OR for removal. OR team aware. Baby in NICU. Pt pain in managed at this time. Epidural in place. Anesthesia will be by to bolus.   0725- Pt in OR and vaginal prep performed.   0739- Dr Salas placed red cassidy to drain bladder. And procedure started.   0900- Pt out of OR and back in room s219. VSS. Pt without pain at this time. Family at bedside for support.   1030- Pt assisted up to BR and voided successfully. Pad and underwear applied.   1044- Pt transferred to PPU via wheel chair. FOB at side. Report given to GHASSAN Jean Baptiste. Pitocin bolus finishing.

## 2024-05-14 NOTE — ANESTHESIA PROCEDURE NOTES
Epidural Block    Performed by: Deysi Rodriguez M.D.  Authorized by: Deysi Rodriguez M.D.    Patient Location:  OB  Reason for Block: labor analgesia    patient identified, IV checked, site marked, risks and benefits discussed, surgical consent, monitors and equipment checked and pre-op evaluation    Patient Position:  Sitting  Prep: ChloraPrep, patient draped and sterile technique    Monitoring:  Blood pressure, continuous pulse oximetry and heart rate  Approach:  Midline  Location:  L4-L5  Injection Technique:  CHARLENE saline  Skin infiltration:  Lidocaine  Strength:  1%  Dose:  3ml  Needle Type:  Tuohy  Needle Gauge:  17 G  Needle Length:  3.5 in  Loss of resistance::  7  Catheter Size:  19 G  Catheter at Skin Depth:  11   Pt with preexisitng working epidural left in post delivery. No need to replace or attempt spinal block.

## 2024-05-14 NOTE — ANESTHESIA POSTPROCEDURE EVALUATION
Patient: Sushma Verde    Procedure Summary       Date: 05/14/24 Room / Location: [facility] OR 03 / SURGERY LABOR AND DELIVERY    Anesthesia Start: 0723 Anesthesia Stop: 0900    Procedure: DILATION AND CURETTAGE (Vagina ) Diagnosis: (Retained placenta)    Surgeons: Janet Salas M.D. Responsible Provider: Deysi Rodriguez M.D.    Anesthesia Type: epidural ASA Status: 2            Final Anesthesia Type: epidural  Last vitals  BP   Blood Pressure: 113/60    Temp   37.9 °C (100.2 °F) (dr esquivel updated; iv tylenol ordered)    Pulse   85   Resp   18    SpO2   93 %      Anesthesia Post Evaluation    Patient location during evaluation: floor  Patient participation: complete - patient participated  Level of consciousness: awake and alert  Pain score: 3    Airway patency: patent  Anesthetic complications: no  Cardiovascular status: hemodynamically stable  Respiratory status: acceptable  Hydration status: euvolemic    PONV: none    patient able to participate, but full recovery from regional anesthesia has not occurred and is not expected within the stipulated timeframe for the completion of the evaluation      No notable events documented.     Nurse Pain Score: 4 (NPRS)

## 2024-05-14 NOTE — CARE PLAN
The patient is Stable - Low risk of patient condition declining or worsening    Shift Goals  Clinical Goals: lochia wdl  Patient Goals: dilat  Family Goals: support    Progress made toward(s) clinical / shift goals:  FF@U with light lochia    Patient is not progressing towards the following goals:

## 2024-05-15 LAB
ERYTHROCYTE [DISTWIDTH] IN BLOOD BY AUTOMATED COUNT: 47 FL (ref 35.9–50)
HCT VFR BLD AUTO: 24.7 % (ref 37–47)
HGB BLD-MCNC: 8.5 G/DL (ref 12–16)
MCH RBC QN AUTO: 31.5 PG (ref 27–33)
MCHC RBC AUTO-ENTMCNC: 34.4 G/DL (ref 32.2–35.5)
MCV RBC AUTO: 91.5 FL (ref 81.4–97.8)
PLATELET # BLD AUTO: 154 K/UL (ref 164–446)
PMV BLD AUTO: 10.6 FL (ref 9–12.9)
RBC # BLD AUTO: 2.7 M/UL (ref 4.2–5.4)
WBC # BLD AUTO: 14.6 K/UL (ref 4.8–10.8)

## 2024-05-15 RX ORDER — FERROUS GLUCONATE 324(38)MG
324 TABLET ORAL 2 TIMES DAILY WITH MEALS
Status: DISCONTINUED | OUTPATIENT
Start: 2024-05-15 | End: 2024-05-16 | Stop reason: HOSPADM

## 2024-05-15 RX ADMIN — AMPICILLIN SODIUM 2000 MG: 2 INJECTION, POWDER, FOR SOLUTION INTRAVENOUS at 06:11

## 2024-05-15 RX ADMIN — ACETAMINOPHEN 1000 MG: 500 TABLET, FILM COATED ORAL at 12:52

## 2024-05-15 RX ADMIN — ACETAMINOPHEN 1000 MG: 500 TABLET, FILM COATED ORAL at 04:26

## 2024-05-15 RX ADMIN — IBUPROFEN 800 MG: 800 TABLET, FILM COATED ORAL at 17:48

## 2024-05-15 RX ADMIN — FERROUS GLUCONATE 324 MG: 324 TABLET ORAL at 08:00

## 2024-05-15 RX ADMIN — FERROUS GLUCONATE 324 MG: 324 TABLET ORAL at 17:48

## 2024-05-15 RX ADMIN — DOCUSATE SODIUM 100 MG: 100 CAPSULE, LIQUID FILLED ORAL at 08:00

## 2024-05-15 RX ADMIN — AMPICILLIN SODIUM 2000 MG: 2 INJECTION, POWDER, FOR SOLUTION INTRAVENOUS at 00:30

## 2024-05-15 RX ADMIN — ACETAMINOPHEN 1000 MG: 500 TABLET, FILM COATED ORAL at 20:17

## 2024-05-15 RX ADMIN — IBUPROFEN 800 MG: 800 TABLET, FILM COATED ORAL at 06:33

## 2024-05-15 RX ADMIN — PRENATAL WITH FERROUS FUM AND FOLIC ACID 1 TABLET: 3080; 920; 120; 400; 22; 1.84; 3; 20; 10; 1; 12; 200; 27; 25; 2 TABLET ORAL at 08:00

## 2024-05-15 RX ADMIN — DOCUSATE SODIUM 100 MG: 100 CAPSULE, LIQUID FILLED ORAL at 20:20

## 2024-05-15 ASSESSMENT — PAIN DESCRIPTION - PAIN TYPE
TYPE: ACUTE PAIN

## 2024-05-15 ASSESSMENT — EDINBURGH POSTNATAL DEPRESSION SCALE (EPDS)
THINGS HAVE BEEN GETTING ON TOP OF ME: NO, MOST OF THE TIME I HAVE COPED QUITE WELL
I HAVE BEEN SO UNHAPPY THAT I HAVE BEEN CRYING: NO, NEVER
I HAVE BEEN SO UNHAPPY THAT I HAVE HAD DIFFICULTY SLEEPING: NOT AT ALL
I HAVE FELT SAD OR MISERABLE: NO, NOT AT ALL
THE THOUGHT OF HARMING MYSELF HAS OCCURRED TO ME: NEVER
I HAVE BEEN ANXIOUS OR WORRIED FOR NO GOOD REASON: YES, SOMETIMES
I HAVE FELT SCARED OR PANICKY FOR NO GOOD REASON: NO, NOT AT ALL
I HAVE BEEN ABLE TO LAUGH AND SEE THE FUNNY SIDE OF THINGS: AS MUCH AS I ALWAYS COULD
I HAVE LOOKED FORWARD WITH ENJOYMENT TO THINGS: AS MUCH AS I EVER DID
I HAVE BLAMED MYSELF UNNECESSARILY WHEN THINGS WENT WRONG: NO, NEVER

## 2024-05-15 NOTE — LACTATION NOTE
Problem: At Risk for Falls  Goal: # Patient does not fall  Outcome: Outcome Met, Continue evaluating goal progress toward completion  Goal: # Verbalizes understanding of fall risk/precautions  Description: Document education using the patient education activity  Outcome: Outcome Met, Continue evaluating goal progress toward completion      Sushma is a 28 year old  who delivered vaginally on  at 0601. Her daughter, Clarisse Cuenca was born at 38+4 and weighed 3050 grams. Clarisse is currently in the NICU for respiratory support and cooling. Sushma did need surgical removal of retained placental fragments after delivery.     Sushma is pumping every three hours but did need a longer sleep period after delivery. After pumping, her partner has been hand expressing Sushma's breasts; 7 ml collected and taken to NICU by FOB.     Flange fit evaluated; 22 mm inserted added for right breast. We assessed the need for insert on left breast but Sushma reported better sensation on the left without the insert.   Scent cloths given and use reviewed.    Sushma is currently on Medicaid and WIC referral faxed to [facility].     Parents reassured of continuing lactation support.

## 2024-05-15 NOTE — L&D DELIVERY NOTE
DATE OF SERVICE:  05/14/2024     PREOPERATIVE DIAGNOSES:  1.  Intrauterine pregnancy at 38 and 4/7th weeks' gestation.  2.  Premature rupture of membranes at term.  3.  Prolonged rupture of membranes.  4.  Chorioamnionitis.     POSTOPERATIVE DIAGNOSES:  1.  Intrauterine pregnancy at 38 and 4/7th weeks' gestation.  2.  Premature rupture of membranes at term.  3.  Prolonged rupture of membranes.  4.  Chorioamnionitis.  5.  Retained placenta, requiring surgical intervention.    Procedure:   Spontaneous Vaginal Delivery     DELIVERING PHYSICIAN:  Janet Salas M.D.     ANESTHESIA:  Epidural.  North placed her second epidural in labor.     ESTIMATED BLOOD LOSS:  300 mL.     FINDINGS:  Female infant, cephalic presentation, clear amniotic fluid with   terminal meconium.  Nuchal cord x 1. Apgars given were 2, 3, 5 and 7, cord gases were collected   by myself and sent, although I cannot locate them that they were actually   ran.  A second-degree midline vaginal laceration repaired with 3-0 Vicryl.    After 40 minutes, an attempt for manual extraction of the placenta was   unsuccessful.  Required surgical intervention to the operating room for   dilation and curettage (see separate operating room report).     COMPLICATIONS:  Had to have 2nd epidural placed during labor.   Prolonged rupture of chorioamnionitis, deep variables present,   required amnioinfusion, broken fetal  tracing, category 3 tracing at delivery, caput   and molding on the head present and therefore vacuum delivery was not entertained.         DISPOSITION:  The patient was taken to the operating room for suction dilation   and curettage for retained placenta.  The baby was intubated in the room and   was taken to the NICU.     PROCEDURE:  Around 5:30 a.m., the patient was pushing and having deep   variables.  Fetal tracing was broken, as it was an external monitor, as   the patient had requested for the fetal scalp electrode that was originally placed   to  be removed a few hours prior. With decreased variability to deep variables    And a category 3 tracing and the broken tracing I insisted  the fetal scalp electrode needed to be placed again.  I was at bedside and placed the fetal scalp electrode. The fetal head was extremely low with some caput and molding and she was pushing very effectively.  I was not comfortable with consideration for a vacuum delivery with the amount of caput and molding that was present. With her pushing efforts she also had pushed the intrauterine pressure catheter device out and there was some time without a tocometer until I requested the RN replace the external toco monitor.  She pushed a few more pushes and then delivered a female infant over second-degree midline vaginal laceration.  There was a nuchal cord x1,   delivered through.  There was terminal meconium.  Respiratory therapist was present at delivery.  The nuchal cord was reduced and the cord was immediately clamped and cut and the baby was handed off to delivery team nurse and respiratory therapist.  Cord gases   were collected by myself and asked to be sent; however, unfortunately, they were never received by the lab.  The baby was attended to by delivery team and then the NICU team and then intubated in the room and then taken to the NICU.  The father of the baby went with the NICU with the baby.    Her second-degree laceration was repaired while waiting for the placenta to be delivered.  After  40 minutes, the placenta did not deliver.  Manual extraction was attempted several times in the room and after an unsuccessful attempts, she was then taken to the operating room for suction dilation and curettage.  Total estimated blood loss in the room 300 mL.  Sponge, laps and needle counts were correct.      Risks, benefits, and alternatives were discussed with the patient and her mother at the bedside further suction dilation and curettage, risks for bleeding, infection, pain,  injury to the uterus itself or need for blood transfusion.  All questions were answered to their satisfaction.        ______________________________  MD ANDREW MICHELLE/FABIANA/DANIEL    DD:  05/14/2024 09:46  DT:  05/14/2024 10:09    Job#:  [id number]

## 2024-05-15 NOTE — CARE PLAN
The patient is Stable - Low risk of patient condition declining or worsening    Shift Goals  Clinical Goals: Lochia WDL  Patient Goals: dilat  Family Goals: support    Progress made toward(s) clinical / shift goals:    Problem: Psychosocial - Postpartum  Goal: Patient will verbalize and demonstrate effective bonding and parenting behavior  Outcome: Progressing     Problem: Altered Physiologic Condition  Goal: Patient physiologically stable as evidenced by normal lochia, palpable uterine involution and vitals within normal limits  Outcome: Progressing     Problem: Infection - Postpartum  Goal: Postpartum patient will be free of signs and symptoms of infection  Outcome: Progressing       Patient is not progressing towards the following goals:

## 2024-05-15 NOTE — PROGRESS NOTES
Report reiceived from AM RN at 1900. Patient sitting up in bed, denies pain. Fundus firm umbilicus; lochia scant. POC discussed with patient. Call light within reach.

## 2024-05-15 NOTE — DISCHARGE PLANNING
NICU Assessment     Competed chart review. Met with parents at mother's PP bedside.      Reason for Referral: NICU admission  Address: [address] [place] [address]  Type of Living Situation:stable  Mom Diagnosis: post partum    Baby Diagnosis: baby transferred to NICU after resuscitation for lack of respirator drive and low heart rate   Primary Language: english     Name of Baby: Clarisse Fenton  Father of the Baby: Kapil Fenton - goes by Richard  Involved in baby’s care? yes  Contact Information: [phone]  Mother's name: Sushma Verde  Contact Information: [phone]  Prenatal Care: yes -  Working  PCP for new baby:parents are planning to establish with a [facility] provider prior to discharge.      Support System: large support system with numerous family members in the medical field.   Coping/Feeling well informed: Parents are tearful after traumatic delivery. They are appropriately concerned and processing well. They are open about their feelings and very supportive toward each other. They identify an excellent support system  Source of Feeding: breast  Supplies for Infant: prepared     Mom's Insurance: Medicaid  Baby Covered on Insurance:yes  Mother Employed/School: not currently employed  Other children in the home/names & ages: first baby     Financial Hardship/Income: denies   Mom's Mental status: alert and oriented  Services used prior to admit: no     Active listening, reflection, validation, normalization and empathetic support utilized throughout this visit.      Will follow for support and resources and any discharge planning needs.       Clearance for Discharge: Infant to discharge home to parents when medically ready.

## 2024-05-15 NOTE — PROGRESS NOTES
OBSTETRICS AND GYNECOLOGY  Postpartum Progress Note    CC: PPD1 s/p  and POD1 s/p D&C for retained placenta    S: Pt feeling well.  Pain well controlled, most of her discomfort is at perineal repair.  Cyndy reg diet.  Has ambulated.  Lochia mild. Voiding w/o difficulty.  +flatus/-BM.  Pumping for baby in the NICU.      O:   Vitals:    24 1123 24 2200 05/15/24 0200 05/15/24 0506   BP: 123/82 109/70 105/64 94/69   Pulse: 84 78 80 67   Resp: 17 18 18 18   Temp: 36.4 °C (97.6 °F) 36.2 °C (97.1 °F) 37.2 °C (98.9 °F) 36.4 °C (97.5 °F)   TempSrc: Temporal Temporal Temporal Temporal   SpO2: 96% 97% 97% 97%   Weight:       Height:          Temp (24hrs), Av.6 °C (97.8 °F), Min:36.2 °C (97.1 °F), Max:37.2 °C (98.9 °F)       Gen: NAD, AAO    CV: RRR    Pulm: unlabored    Abd: Soft, NT, no rebound/guarding, fundus firm at U-2.    Ext: WWP, 1+ edema, non-tender to palpation    Recent Labs     24  1245 24  0906 24   WBC 10.9* 28.8* 20.5*   RBC 3.91* 3.61* 2.88*   HEMOGLOBIN 12.5 11.6* 9.0*   HEMATOCRIT 35.7* 33.2* 26.6*   MCV 91.3 92.0 92.4   MCH 32.0 32.1 31.3   RDW 45.5 46.3 47.0   PLATELETCT 235 227 157*   MPV 10.5 11.1 11.0   NEUTSPOLYS 73.90* 88.40*  --    LYMPHOCYTES 18.10* 4.70*  --    MONOCYTES 6.20 5.60  --    EOSINOPHILS 0.30 0.00  --    BASOPHILS 0.30 0.20  --          A/P: Sushma Verde is a 28 y.o.  postpartum s/p .  AVSS.  Recovering well.      - Continue routine postpartum care.    - Encourage ambulation.    - Continue current pain regimen    - IAI: as afebrile now 24h post delivery will d/c abx    - Acute blood loss anemia: Start Fe, H/H drop seemed appropriate for EBL, but will recheck this am.      Dispo: Anticipate d/c tomorrow        Christopher Gibbs MD, MS, FACOG   5/15/2024, 6:41 AM     [facility]   [phone]

## 2024-05-16 VITALS
OXYGEN SATURATION: 96 % | WEIGHT: 200 LBS | RESPIRATION RATE: 16 BRPM | DIASTOLIC BLOOD PRESSURE: 72 MMHG | TEMPERATURE: 97.2 F | HEIGHT: 64 IN | SYSTOLIC BLOOD PRESSURE: 109 MMHG | HEART RATE: 69 BPM | BODY MASS INDEX: 34.15 KG/M2

## 2024-05-16 RX ORDER — PSEUDOEPHEDRINE HCL 30 MG
100 TABLET ORAL 2 TIMES DAILY PRN
COMMUNITY
Start: 2024-05-16

## 2024-05-16 RX ORDER — FERROUS GLUCONATE 324(38)MG
324 TABLET ORAL DAILY
Qty: 60 TABLET | Refills: 0 | Status: SHIPPED | OUTPATIENT
Start: 2024-05-16

## 2024-05-16 RX ORDER — SIMETHICONE 125 MG
125 TABLET,CHEWABLE ORAL 4 TIMES DAILY PRN
COMMUNITY
Start: 2024-05-16

## 2024-05-16 RX ORDER — VITAMIN A ACETATE, BETA CAROTENE, ASCORBIC ACID, CHOLECALCIFEROL, .ALPHA.-TOCOPHEROL ACETATE, DL-, THIAMINE MONONITRATE, RIBOFLAVIN, NIACINAMIDE, PYRIDOXINE HYDROCHLORIDE, FOLIC ACID, CYANOCOBALAMIN, CALCIUM CARBONATE, FERROUS FUMARATE, ZINC OXIDE, CUPRIC OXIDE 3080; 12; 120; 400; 1; 1.84; 3; 20; 22; 920; 25; 200; 27; 10; 2 [IU]/1; UG/1; MG/1; [IU]/1; MG/1; MG/1; MG/1; MG/1; MG/1; [IU]/1; MG/1; MG/1; MG/1; MG/1; MG/1
1 TABLET, FILM COATED ORAL DAILY
COMMUNITY
Start: 2024-05-16

## 2024-05-16 RX ORDER — IBUPROFEN 800 MG/1
800 TABLET ORAL EVERY 8 HOURS PRN
Qty: 30 TABLET | Refills: 0 | Status: SHIPPED | OUTPATIENT
Start: 2024-05-16 | End: 2024-05-26

## 2024-05-16 RX ORDER — ACETAMINOPHEN 500 MG
1000 TABLET ORAL EVERY 6 HOURS PRN
COMMUNITY
Start: 2024-05-16

## 2024-05-16 RX ADMIN — DOCUSATE SODIUM 100 MG: 100 CAPSULE, LIQUID FILLED ORAL at 08:48

## 2024-05-16 RX ADMIN — ACETAMINOPHEN 1000 MG: 500 TABLET, FILM COATED ORAL at 08:48

## 2024-05-16 RX ADMIN — ACETAMINOPHEN 1000 MG: 500 TABLET, FILM COATED ORAL at 02:48

## 2024-05-16 RX ADMIN — IBUPROFEN 800 MG: 800 TABLET, FILM COATED ORAL at 11:20

## 2024-05-16 RX ADMIN — PRENATAL WITH FERROUS FUM AND FOLIC ACID 1 TABLET: 3080; 920; 120; 400; 22; 1.84; 3; 20; 10; 1; 12; 200; 27; 25; 2 TABLET ORAL at 08:48

## 2024-05-16 RX ADMIN — IBUPROFEN 800 MG: 800 TABLET, FILM COATED ORAL at 02:48

## 2024-05-16 RX ADMIN — FERROUS GLUCONATE 324 MG: 324 TABLET ORAL at 08:48

## 2024-05-16 ASSESSMENT — PAIN DESCRIPTION - PAIN TYPE
TYPE: ACUTE PAIN
TYPE: ACUTE PAIN

## 2024-05-16 NOTE — CARE PLAN
The patient is Stable - Low risk of patient condition declining or worsening    Shift Goals  Clinical Goals: pain control, rest, maintain VSS  Patient Goals: dilat  Family Goals: support    Progress made toward(s) clinical / shift goals:  Pain controled with PRN medications, pt able to ambulate. VSS.     Patient is not progressing towards the following goals:

## 2024-05-16 NOTE — DISCHARGE INSTRUCTIONS
PATIENT DISCHARGE EDUCATION INSTRUCTION SHEET    REASONS TO CALL YOUR OBSTETRICIAN  Persistent fever, shaking, chills (Temperature higher than 100.4) may indicate you have an infection  Heavy bleeding: soaking more than 1 pad per hour; Passing clots an egg-sized clot or bigger may mean you have an postpartum hemorrhage  Foul odor from vagina or bad smelling or discolored discharge or blood  Breast infection (Mastitis symptoms); breast pain, chills, fever, redness or red streaks, may feel flu like symptoms  Urinary pain, burning or frequency  Incision that is not healing, increased redness, swelling, tenderness or pain, or any pus from episiotomy or  site may mean you have an infection  Redness, swelling, warmth, or painful to touch in the calf area of your leg may mean you have a blood clot  Severe or intensified depression, thoughts or feelings of wanting to hurt yourself or someone else   Pain in chest, obstructed breathing or shortness of breath (trouble catching your breath) may mean you are having a postpartum complication. Call your provider immediately   Headache that does not get better, even after taking medicine, a bad headache with vision changes or pain in the upper right area of your belly may mean you have high blood pressure or post birth preeclampsia. Call your provider immediately    HAND WASHING  All family and friends should wash their hands:  Before and after holding the baby  Before feeding the baby  After using the restroom or changing the baby's diaper    WOUND CARE  Ask your physician for additional care instructions. In general:   Incision:  May shower and pat incision dry   Keep the incision clean and dry  There should not be any opening or pus from the incision  Continue to walk at home 3 times a day   Do NOT lift anything heavier than your baby (over 10 pounds)  Encourage family to help participate in care of the  to allow rest and mom time to  heal  Episiotomy/Laceration  May use valeria-spray bottle, witch hazel pads and dermaplast spray for comfort  Use valeria-spray bottle after urinating to cleanse perineal area  To prevent burning during urination spray valeria-water bottle on labial area   Pat perineal area dry until episiotomy/laceration is healed  Continue to use valeria-bottle until bleeding stops as needed  If have a 2nd degree laceration or greater, a Sitz bath can offer relief from soreness, burning, and inflammation   Sitz Bath   Sit in 6 inches of warm water and soak laceration as needed until the laceration heals    VAGINAL CARE AND BLEEDING  Nothing inside vagina for 6 weeks:   No sexual intercourse, tampons or douching  Bleeding may continue for 2-4 weeks. Amount and color may vary  Soaking 1 pad or more in an hour for several hours is considered heavy bleeding  Passing large egg sized blood clots can be concerning  If you feel like you have heavy bleeding or are having increasing amount of blood clots call your Obstetrician immediately  If you begin feeling faint upon standing, feeling sick to your stomach, have clammy skin, a really fast heartbeat, have chills, start feeling confused, dizzy, sleepy or weak, or feeling like you're going to faint call your Obstetrician immediately    HYPERTENSION   Preeclampsia or gestational hypertension are types of high blood pressure that only pregnant women can get. It is important for you to be aware of symptoms to seek early intervention and treatment. If you have any of these symptoms immediately call your Obstetrician    Vision changes or blurred vision   Severe headache or pain that is unrelieved with medication and will not go away  Persistent pain in upper abdomen or shoulder   Increased swelling of face, feet, or hands  Difficulty breathing or shortness of breath at rest  Urinating less than usual    URINATION AND BOWEL MOVEMENTS  Eating more fiber (bran cereal, fruits, and vegetables) and drinking  "plenty of fluids will help to avoid constipation  Urinary frequency and urgency after childbirth is normal  If you experience any urinary pain, burning or frequency call your provider    BIRTH CONTROL  It is possible to become pregnant at any time after delivery and while breastfeeding  Plan to discuss a method of birth control with your physician at your post delivery follow up visit    POSTPARTUM BLUES  During the first few days after birth, you may experience a sense of the \"blues\" which may include impatience, irritability or even crying. These feelings come and go quickly. However, as many as 1 in 10 women experience emotional symptoms known as postpartum depression.     POSTPARTUM DEPRESSION    May start as early as the second or third day after delivery or take several weeks or months to develop. Symptoms of \"blues\" are present, but are more intense: Crying spells; loss of appetite; feelings of hopelessness or loss of control; fear of touching the baby; over concern or no concern at all about the baby; little or no concern about your own appearance/caring for yourself; and/or inability to sleep or excessive sleeping. Contact your Obstetrician if you are experiencing any of these symptoms     PREVENTING SHAKEN BABY  If you are angry or stressed, PUT THE BABY IN THE CRIB, step away, take some deep breaths, and wait until you are calm to care for the baby. DO NOT SHAKE THE BABY. You are not alone, call a supporter for help.  [facility] 24/7 crisis call line (638-556-0492) or (1-754.516.7646)  You can also text them, text \"ANSWER\" (058274)      Postpartum Care After Vaginal Delivery  This sheet gives you information about how to care for yourself from the time you deliver your baby to up to 6-12 weeks after delivery (postpartum period). Your health care provider may also give you more specific instructions. If you have problems or questions, contact your health care provider.  Follow these instructions at " home:  Vaginal bleeding  It is normal to have vaginal bleeding (lochia) after delivery. Wear a sanitary pad for vaginal bleeding and discharge.  During the first week after delivery, the amount and appearance of lochia is often similar to a menstrual period.  Over the next few weeks, it will gradually decrease to a dry, yellow-brown discharge.  For most women, lochia stops completely by 4-6 weeks after delivery. Vaginal bleeding can vary from woman to woman.  Change your sanitary pads frequently. Watch for any changes in your flow, such as:  A sudden increase in volume.  A change in color.  Large blood clots.  If you pass a blood clot from your vagina, save it and call your health care provider to discuss. Do not flush blood clots down the toilet before talking with your health care provider.  Do not use tampons or douches until your health care provider says this is safe.  If you are not breastfeeding, your period should return 6-8 weeks after delivery. If you are feeding your child breast milk only (exclusive breastfeeding), your period may not return until you stop breastfeeding.  Perineal care  Keep the area between the vagina and the anus (perineum) clean and dry as told by your health care provider. Use medicated pads and pain-relieving sprays and creams as directed.  If you had a cut in the perineum (episiotomy) or a tear in the vagina, check the area for signs of infection until you are healed. Check for:  More redness, swelling, or pain.  Fluid or blood coming from the cut or tear.  Warmth.  Pus or a bad smell.  You may be given a squirt bottle to use instead of wiping to clean the perineum area after you go to the bathroom. As you start healing, you may use the squirt bottle before wiping yourself. Make sure to wipe gently.  To relieve pain caused by an episiotomy, a tear in the vagina, or swollen veins in the anus (hemorrhoids), try taking a warm sitz bath 2-3 times a day. A sitz bath is a warm water bath  that is taken while you are sitting down. The water should only come up to your hips and should cover your buttocks.  Breast care  Within the first few days after delivery, your breasts may feel heavy, full, and uncomfortable (breast engorgement). Milk may also leak from your breasts. Your health care provider can suggest ways to help relieve the discomfort. Breast engorgement should go away within a few days.  If you are breastfeeding:  Wear a bra that supports your breasts and fits you well.  Keep your nipples clean and dry. Apply creams and ointments as told by your health care provider.  You may need to use breast pads to absorb milk that leaks from your breasts.  You may have uterine contractions every time you breastfeed for up to several weeks after delivery. Uterine contractions help your uterus return to its normal size.  If you have any problems with breastfeeding, work with your health care provider or lactation consultant.  If you are not breastfeeding:  Avoid touching your breasts a lot. Doing this can make your breasts produce more milk.  Wear a good-fitting bra and use cold packs to help with swelling.  Do not squeeze out (express) milk. This causes you to make more milk.  Intimacy and sexuality  Ask your health care provider when you can engage in sexual activity. This may depend on:  Your risk of infection.  How fast you are healing.  Your comfort and desire to engage in sexual activity.  You are able to get pregnant after delivery, even if you have not had your period. If desired, talk with your health care provider about methods of birth control (contraception).  Medicines  Take over-the-counter and prescription medicines only as told by your health care provider.  If you were prescribed an antibiotic medicine, take it as told by your health care provider. Do not stop taking the antibiotic even if you start to feel better.  Activity  Gradually return to your normal activities as told by your  health care provider. Ask your health care provider what activities are safe for you.  Rest as much as possible. Try to rest or take a nap while your baby is sleeping.  Eating and drinking    Drink enough fluid to keep your urine pale yellow.  Eat high-fiber foods every day. These may help prevent or relieve constipation. High-fiber foods include:  Whole grain cereals and breads.  Brown rice.  Beans.  Fresh fruits and vegetables.  Do not try to lose weight quickly by cutting back on calories.  Take your prenatal vitamins until your postpartum checkup or until your health care provider tells you it is okay to stop.  Lifestyle  Do not use any products that contain nicotine or tobacco, such as cigarettes and e-cigarettes. If you need help quitting, ask your health care provider.  Do not drink alcohol, especially if you are breastfeeding.  General instructions  Keep all follow-up visits for you and your baby as told by your health care provider. Most women visit their health care provider for a postpartum checkup within the first 3-6 weeks after delivery.  Contact a health care provider if:  You feel unable to cope with the changes that your child brings to your life, and these feelings do not go away.  You feel unusually sad or worried.  Your breasts become red, painful, or hard.  You have a fever.  You have trouble holding urine or keeping urine from leaking.  You have little or no interest in activities you used to enjoy.  You have not  at all and you have not had a menstrual period for 12 weeks after delivery.  You have stopped breastfeeding and you have not had a menstrual period for 12 weeks after you stopped breastfeeding.  You have questions about caring for yourself or your baby.  You pass a blood clot from your vagina.  Get help right away if:  You have chest pain.  You have difficulty breathing.  You have sudden, severe leg pain.  You have severe pain or cramping in your lower abdomen.  You bleed  from your vagina so much that you fill more than one sanitary pad in one hour. Bleeding should not be heavier than your heaviest period.  You develop a severe headache.  You faint.  You have blurred vision or spots in your vision.  You have bad-smelling vaginal discharge.  You have thoughts about hurting yourself or your baby.  If you ever feel like you may hurt yourself or others, or have thoughts about taking your own life, get help right away. You can go to the nearest emergency department or call:  Your local emergency services (911 in the [place]  A suicide crisis helpline, such as the National Suicide Prevention Lifeline at 1-663.810.3789. This is open 24 hours a day.  Summary  The period of time right after you deliver your  up to 6-12 weeks after delivery is called the postpartum period.  Gradually return to your normal activities as told by your health care provider.  Keep all follow-up visits for you and your baby as told by your health care provider.  This information is not intended to replace advice given to you by your health care provider. Make sure you discuss any questions you have with your health care provider.  Document Released: 10/14/2008 Document Revised: 2018 Document Reviewed: 10/01/2018  Elsevier Patient Education ©  [employer]

## 2024-05-16 NOTE — LACTATION NOTE
This note was copied from a baby's chart.  0900 - Parents were in NICU when LC came to visit.     1045 - Mother was on the phone when LC came back to room. FOB reported that mom was going to pump after phone call and eating.   LC encouraged to call if assistance is needed.

## 2024-05-16 NOTE — DISCHARGE SUMMARY
Obstetrics Discharge Summary    Admission Date: 2024         Discharge Date: 2024    ADMISSION DIAGNOSIS:  1. SIUP @ 38+2wga  2. PROM    DISCHARGE DIAGNOSIS:  1. SIUP @ 38+4wga  2. PROM  3. Prolonged ROM  4. Chorioramnionitis  5. Retained placenta requiring surgical intervention    DETAILS OF HOSPITAL STAY  Presenting Problem/History of Present Illness: leaking fluid    Hospital Course:  The patient is a 28 y.o. , who presented to [facility] at 38+2 with a chief complaint of leaking fluid. She had prenatal care with [facility] with Dr. Frost.  Her pregnancy was uncomplicated. She underwent induction of labor with misoprostol x2, then pitocin. She developed chorioamnionitis during labor, for which amp/gent was started. She progressed to full dilation. For full details of the delivery please refer to the delivery note dictation. Briefly, the patient underwent an normal spontaneous vaginal delivery. The delivery was complicated by retained placenta that could not be removed manually. She was taken to the OR for d&c under US guidance. Estimated blood loss was 300ml. The patient delivered a viable female infant weighing 2830g with Apgars as below in delivery summary.  The patient’s recovery and postpartum course were unremarkable. She received antibiotics for 24hrs after delivery and remained afebrile with a decreasing WBC. By postpartum day #2 patient met all appropriate milestones and was stable to be discharged to home. Baby in NICU for respiratory support.    APGARs:   2   3  , 5, 7    Baby's venous blood gas at delivery. Arterial not available   Latest Reference Range & Units 24 06:51   Ph 7.310 - 7.450  6.997 (LL)   BE -4 - 3 mmol/L -23 (L)   (LL): Data is critically low  (L): Data is abnormally low    COMPLICATIONS: None.    PHYSICAL EXAM:  Vitals:   Vitals:    24 0551   BP: 109/72   Pulse: 69   Resp: 16   Temp: 36.2 °C (97.2 °F)   SpO2: 96%        General: Alert, conversational, pleasant, no acute distress  ABD: Soft, non-tender, non-distended, fundus firm, non-tender, below the umbilicus  : Deferred  Extremities: Moves all, trace edema     LABS/STUDIES:    Latest Reference Range & Units 05/12/24 12:45 05/14/24 09:06 05/14/24 20:29 05/15/24 08:00   WBC 4.8 - 10.8 K/uL 10.9 (H) 28.8 (H) 20.5 (H) 14.6 (H)   Hemoglobin 12.0 - 16.0 g/dL 12.5 11.6 (L) 9.0 (L) 8.5 (L)   Hematocrit 37.0 - 47.0 % 35.7 (L) 33.2 (L) 26.6 (L) 24.7 (L)   Platelet Count 164 - 446 K/uL 235 227 157 (L) 154 (L)   (H): Data is abnormally high  (L): Data is abnormally low    DISPOSITION: Home.    DISCHARGE MEDICATIONS:     Medication List        START taking these medications        Instructions   acetaminophen 500 MG Tabs  Commonly known as: Tylenol   Take 2 Tablets by mouth every 6 hours as needed for Mild Pain or Moderate Pain.  Dose: 1,000 mg     Calcium Carbonate Antacid 1000 MG Chew   Chew 1 Tablet every 6 hours as needed (Heartburn).  Dose: 1,000 mg     docusate sodium 100 MG Caps   Take 100 mg by mouth 2 times a day as needed for Constipation.  Dose: 100 mg     ferrous gluconate 324 (38 Fe) MG Tabs  Commonly known as: Fergon   Take 1 Tablet by mouth every day.  Dose: 324 mg     ibuprofen 800 MG Tabs  Commonly known as: Motrin   Take 1 Tablet by mouth every 8 hours as needed for Mild Pain or Moderate Pain for up to 10 days.  Dose: 800 mg     prenatal plus vitamin 27-1 MG Tabs tablet   Take 1 Tablet by mouth every day.  Dose: 1 Tablet     simethicone 125 MG chewable tablet  Commonly known as: Mylicon   Chew 1 Tablet 4 times a day as needed for Flatulence.  Dose: 125 mg            STOP taking these medications      norethindrone-ethinyl estradiol-iron 1.5-30 MG-MCG tablet  Commonly known as: Microgestin Fe1.5/30     zonisamide 100 MG Caps  Commonly known as: Zonegran               DISCHARGE INSTRUCTIONS:  1. Pelvic rest for 6 weeks postpartum.   2. Postpartum visit in 6 weeks at  [facility] [phone].  3. Return to the emergency department if experiencing increased vaginal bleeding, severe pain, temperature greater than 100.4, or any other concerns.    DISCHARGE CONDITION: Stable.    Salima Saxena M.D.

## 2024-05-16 NOTE — CARE PLAN
The patient is Stable - Low risk of patient condition declining or worsening    Shift Goals  Clinical Goals: Patiet VSS; Lochia WDL; Pain WDL  Patient Goals: dilat  Family Goals: support    Progress made toward(s) clinical / shift goals:    Problem: Knowledge Deficit - Postpartum  Goal: Patient will verbalize and demonstrate understanding of self and infant care  Outcome: Met     Problem: Psychosocial - Postpartum  Goal: Patient will verbalize and demonstrate effective bonding and parenting behavior  Outcome: Met     Problem: Altered Physiologic Condition  Goal: Patient physiologically stable as evidenced by normal lochia, palpable uterine involution and vitals within normal limits  Outcome: Met     Problem: Infection - Postpartum  Goal: Postpartum patient will be free of signs and symptoms of infection  Outcome: Met

## 2024-05-16 NOTE — PROGRESS NOTES
Assumed care of patient, report at bedside from, Cat FERRELL. Assessment completed and WDL. Call light within reach, discussed plan of care, denies pain at the moment. Will continue to monitor.

## 2024-05-16 NOTE — PROGRESS NOTES
0830 Assessment completed. Lochia light, fundus firm. Plan of care reviewed. Reports mild pain, see MAR for intervention, will call if further pain intervention needed.   [id number] Discharge instructions and education reviewed with patient, verbalized understanding, papers signed.

## 2024-05-19 ENCOUNTER — LACTATION ENCOUNTER (OUTPATIENT)
Dept: OTHER | Facility: MEDICAL CENTER | Age: 29
End: 2024-05-19

## 2024-05-19 NOTE — LACTATION NOTE
This note was copied from a baby's chart.  During rounds in NICU answered questions about engorgement and refer to [email or web address] to support education. Now pumping 87 ml as milk is coming in.     Infant alert and awake with feeding tube and O2 removed during care. Infant placed skin to skin and position with attempts to latch. Hand expression (flowing) and syringe EBM (3+ml) put on breast with infant actively licking off, but will not latch. Attempt to calm with pacifier and then switch to latch without success. Infant will continue skin to skin. Encourage to attempt latch when present and feeding time occurs and to continue to do STS care often when present. Teach that as she moves towards more active latching attempts, to make an appt for lactation through NICU RN. Family voices understanding.

## 2024-05-23 ENCOUNTER — LACTATION ENCOUNTER (OUTPATIENT)
Dept: OTHER | Facility: MEDICAL CENTER | Age: 29
End: 2024-05-23

## 2024-05-23 NOTE — LACTATION NOTE
This note was copied from a baby's chart.  Appt for lactation consult to assist with latch. Infant place with MOB who requested to leave clothes on but open. Infant very sleepy. Attempt to arouse with nipple to nose using hand expression, but infant only showed minimal effort. Shield had been previously used; applied shield and was able to latch infant who continued to be very sleepy with a few suckles intermittently. EBM placed under shield using syringe and infant continued to be difficult to stimulate to sustain a suck/swallow pattern. Infant left positioned belly to belly with MOB and RN started tube feeding. Encouraged MOB to continue to offer breast ad pennie as allowed by NICU and to make another appt for latch assist as infant is showing greater interest. Family voices understanding.

## (undated) DEVICE — CATHETER IV NON-SAFETY 18 GA X 1 1/4 (50/BX 4BX/CA)

## (undated) DEVICE — VACURETTE 10MM CURVED 10/PKG

## (undated) DEVICE — TRAY SRGPRP PVP IOD WT PRP - (20/CA)

## (undated) DEVICE — SET EXTENSION WITH 2 PORTS (48EA/CA) ***PART #2C8610 IS A SUBSTITUTE*****

## (undated) DEVICE — GLOVE BIOGEL INDICATOR SZ 7SURGICAL PF LTX - (50/BX 4BX/CA)

## (undated) DEVICE — WATER IRRIGATION STERILE 1000ML (12EA/CA)

## (undated) DEVICE — TUBING D & E COLLECTION SET (50EA/PK)

## (undated) DEVICE — PACK DELIVERY ROOM (7EA/CA)

## (undated) DEVICE — SOLUTION 10%PVP-IODINE 8OZ - (24/CA)

## (undated) DEVICE — GLOVE BIOGEL SZ 6.5 SURGICAL PF LTX (50PR/BX 4BX/CA)

## (undated) DEVICE — TUBING CLEARLINK DUO-VENT - C-FLO (48EA/CA)

## (undated) DEVICE — TUBE SUCTION YANKAUER  1/4 X 6FT (20EA/CA)"

## (undated) DEVICE — KIT  I.V. START (100EA/CA)

## (undated) DEVICE — SUTURE 3-0 VICRYL CT-1 ABS 36IN

## (undated) DEVICE — LEGGING LITHOTOMY 31 X 48 IN - (2EA/PK 20PK/CA)

## (undated) DEVICE — HEAD HOLDER JUNIOR/ADULT